# Patient Record
Sex: FEMALE | Race: WHITE | URBAN - METROPOLITAN AREA
[De-identification: names, ages, dates, MRNs, and addresses within clinical notes are randomized per-mention and may not be internally consistent; named-entity substitution may affect disease eponyms.]

---

## 2023-08-07 LAB — AMB EXT THINPREP PAP: NEGATIVE

## 2023-09-12 LAB
AMB EXT ANTIBODY SCREEN: NEGATIVE
AMB EXT CHLAMYDIA, NUCLEIC ACID AMP: NEGATIVE
AMB EXT GONOCOCCUS, NUCLEIC ACID AMP: NEGATIVE
AMB EXT HBSAG SCREEN: NEGATIVE
AMB EXT HEMATOCRIT: 41.1
AMB EXT HEMOGLOBIN: 13.4
AMB EXT HEPATITIS C VIRUS ANTIBODY: NEGATIVE
AMB EXT MCV: 91.7
AMB EXT PLATELETS: 338
AMB EXT RH FACTOR: POSITIVE
AMB EXT RUBELLA ANTIBODIES, IGG: 14.5
AMB EXT TSH: 1.6 MIU/ML

## 2023-12-06 ENCOUNTER — TELEPHONE (OUTPATIENT)
Dept: OBGYN CLINIC | Facility: CLINIC | Age: 38
End: 2023-12-06

## 2023-12-06 NOTE — TELEPHONE ENCOUNTER
RN returned pt call. Pt states she spoke with Krystina Barnard about establishing PN care with our office. Pt is aware that our office has both male and female providers that she will need to rotate throughout her PN care, and knows that the on-call provider will be the delivering doctor on the day of delivery. Pt agreeable to rotation policy. Pt also aware that her PN records will need to be reviewed by our providers and will need at least 3 providers to approve in order to be accepted after 20 weeks. Pt verbalized understanding. Pt made aware that no [facility] records have been received. Pt advised to reach back out to her current OB office and request to fax again. Pt agreed, and states she will likely drop off her PN records. Will await records.

## 2023-12-20 NOTE — TELEPHONE ENCOUNTER
Jj english'earle by 3 Drs. Please assist pt in scheduling OBN appointment. Pts records in [address] [facility] office, in Fax tray.

## 2023-12-20 NOTE — TELEPHONE ENCOUNTER
Received pts PN records from [facility]. PN transfer sheet completed. Pt informed that records will be put out for review today and can take up to a week or so to determine approval or denial. Pt advised to continue care with current practice until she hears from us. Pt verbalized understanding. Records placed on Ks desk.

## 2024-01-08 ENCOUNTER — NURSE ONLY (OUTPATIENT)
Dept: OBGYN CLINIC | Facility: CLINIC | Age: 39
End: 2024-01-08
Payer: COMMERCIAL

## 2024-01-08 DIAGNOSIS — Z34.82 ENCOUNTER FOR SUPERVISION OF OTHER NORMAL PREGNANCY IN SECOND TRIMESTER: Primary | ICD-10-CM

## 2024-01-08 RX ORDER — ASPIRIN 81 MG/1
81 TABLET ORAL DAILY
COMMUNITY

## 2024-01-08 RX ORDER — CHOLECALCIFEROL (VITAMIN D3) 25 MCG
1 TABLET,CHEWABLE ORAL DAILY
COMMUNITY

## 2024-01-08 NOTE — PROGRESS NOTES
Pt seen for OBN appt today with no complaints.  Pt is a transfer from [facility].  Lab results entered in results console and reviewed by DOREEN Singer.  Normal 28wk PN labs, treponemal, HIV and urine culture ordered.  Pt advised all labs must be completed and resulted prior to NPN appt. If labs are not completed and resulted the NPN appt will be cancelled. Assisted pt with scheduling NPN appt with MD with [facility] on 1/18/2024.     Pt is a diet controlled gestational diabetic.  Pt advised to keep log and bring it to her NPN visit.    Pt was complaining of rapid HR that lasts only a few seconds.  Pt states she had 2 episodes on this, one on 1/4/24 and one on 1/6/24.  Pt states she was not doing any activity that would cause this, and was not feeling nervous or anxious at the time.  Pt advised to monitor and call if it recurs.    Height: 5'2\"  Weight: 192  BMI: 35.1    Partner's name is Nikhil Currie contact #[phone]; race:   Occupation:   Pt's race :     MEDICAL HISTORY    Anemia No    Anesthetic complications No    Anxiety/Depression  No    Autoimmune Disorder No    Asthma  No    Cancer No    Diabetes  Yes Gestational diabetes   Gyne/breast Surgery Yes Right oophorectomy over 10 years ago  D&C x2   Heart Disease No    Hepatitis/Liver Disease  No    History of blood transfusion No    History of abnormal pap Yes Per pt, years ago, colpo done   Hypertension  No    Infertility  No    Kidney Disease/Frequent UTIs  No    Medication Allergies No    Latex Allergies No    Food Allergies  Yes    Neurological Disorder/Epilepsy Yes migraines   Operations/Hospitalizations Yes Right oophorectomy over 10 years ago  D&C x2   TB exposure  No    Thyroid Dysfunction No    Trauma/Violence  No    Uterine Anomaly  No    Uterine Fibroids  Yes    Variocosities/DVTs No    Smoker No    Drug usage in prior year No    Alcohol No    Would you accept a blood transfusion? If no, are you a Church? Yes    No             INFECTION HISTORY    Chlamydia No    Pt or partner have hx of Genital Herpes No    Gonorrhea No    Hepatitis B No    HIV No    HPV No    MRSA No    Syphilis No    Tattoos Yes    Live with someone or Exposed to TB No    Rash or viral illness since LMP  No    Varicella Yes As a child   Pets Yes 2 dogs       GENETICS SCREENING    Genetic Screening    Genetic Screening/Teratology Counseling- Includes patient, baby's father, or anyone in either family with:  Patient's age 35 years or older as of estimated date of delivery: Yes     Thalassemia (Italian, Greek, Mediterranean, or  background): MCV less than 80: No     Neural tube defect (Meningomyelocele, Spina bifida, or Anencephaly): No     Congenital heart defect: No     Down syndrome: No     Buster-Sachs (Ashkenazi Confucianism, Cajun, [place]): No     Canavan disease (Ashkenazi Confucianism): No     Familial dysautonomia (Ashkenazi Confucianism): No     Sickle cell disease or trait (): No     Hemophilia or other blood disorders: No     Muscular dystrophy: No    Cystic fibrosis: No     [place]'s chorea: No     Intellectual disability and/or autism: Yes (Comment: Pt's brother has autism)     If yes, was the person tested for Fragile X?: No (Comment: unknown)     Other inherited genetic or chromosomal disorder: Yes (Comment: HOSEA'rahul palmer was born with a heart condition but pt does not know the name)     Maternal metabolic disorder (eg. Type 1 diabetes, PKU): Yes (Comment: Pt's mother is diabetic but pt unsure if type 1 or 2.  Pt is a gestational diabetic)     Patient or baby's father had child with birth defects not listed above: No     Recurrent pregnancy loss, or a stillbirth: Yes (Comment: Pt's sister had 1-2 miscarriages)     Medications (including supplements, vitamins, herbs, or OTC drugs)/illicit/recreational drugs/alcohol since last menstrual period: Yes     If yes, agent(s) and strength/dosage: Pnv, bASA, Tylenol, Benadryl, amoxicillin, eye drops                  MISC    Infant vaccinations  Yes    Pt. Given What pregnant women need to know handout.

## 2024-01-18 ENCOUNTER — LAB ENCOUNTER (OUTPATIENT)
Dept: LAB | Facility: HOSPITAL | Age: 39
End: 2024-01-18
Attending: OBSTETRICS & GYNECOLOGY
Payer: COMMERCIAL

## 2024-01-18 ENCOUNTER — TELEPHONE (OUTPATIENT)
Dept: OBGYN CLINIC | Facility: CLINIC | Age: 39
End: 2024-01-18

## 2024-01-18 ENCOUNTER — INITIAL PRENATAL (OUTPATIENT)
Dept: OBGYN CLINIC | Facility: CLINIC | Age: 39
End: 2024-01-18
Payer: COMMERCIAL

## 2024-01-18 VITALS
SYSTOLIC BLOOD PRESSURE: 110 MMHG | WEIGHT: 190.38 LBS | BODY MASS INDEX: 35.03 KG/M2 | HEIGHT: 62 IN | DIASTOLIC BLOOD PRESSURE: 71 MMHG | HEART RATE: 86 BPM

## 2024-01-18 DIAGNOSIS — O24.319 PRE-EXISTING DIABETES MELLITUS DURING PREGNANCY, ANTEPARTUM: ICD-10-CM

## 2024-01-18 DIAGNOSIS — Z34.82 ENCOUNTER FOR SUPERVISION OF OTHER NORMAL PREGNANCY IN SECOND TRIMESTER: ICD-10-CM

## 2024-01-18 DIAGNOSIS — Z34.93 ENCOUNTER FOR SUPERVISION OF NORMAL PREGNANCY IN THIRD TRIMESTER, UNSPECIFIED GRAVIDITY: Primary | ICD-10-CM

## 2024-01-18 DIAGNOSIS — O09.521 AMA (ADVANCED MATERNAL AGE) MULTIGRAVIDA 35+, FIRST TRIMESTER: Primary | ICD-10-CM

## 2024-01-18 PROBLEM — O09.529 AMA (ADVANCED MATERNAL AGE) MULTIGRAVIDA 35+: Status: ACTIVE | Noted: 2024-01-18

## 2024-01-18 PROBLEM — O09.529 AMA (ADVANCED MATERNAL AGE) MULTIGRAVIDA 35+ (HCC): Status: ACTIVE | Noted: 2024-01-18

## 2024-01-18 LAB
APPEARANCE: CLEAR
BASOPHILS # BLD AUTO: 0.03 X10(3) UL (ref 0–0.2)
BASOPHILS NFR BLD AUTO: 0.3 %
BILIRUBIN: NEGATIVE
DEPRECATED RDW RBC AUTO: 45.3 FL (ref 35.1–46.3)
EOSINOPHIL # BLD AUTO: 0.14 X10(3) UL (ref 0–0.7)
EOSINOPHIL NFR BLD AUTO: 1.5 %
ERYTHROCYTE [DISTWIDTH] IN BLOOD BY AUTOMATED COUNT: 13.5 % (ref 11–15)
GLUCOSE (URINE DIPSTICK): NEGATIVE MG/DL
HCT VFR BLD AUTO: 33.4 %
HGB BLD-MCNC: 11.4 G/DL
IMM GRANULOCYTES # BLD AUTO: 0.03 X10(3) UL (ref 0–1)
IMM GRANULOCYTES NFR BLD: 0.3 %
KETONES (URINE DIPSTICK): 40 MG/DL
LEUKOCYTES: NEGATIVE
LYMPHOCYTES # BLD AUTO: 1.81 X10(3) UL (ref 1–4)
LYMPHOCYTES NFR BLD AUTO: 19.3 %
MCH RBC QN AUTO: 31.1 PG (ref 26–34)
MCHC RBC AUTO-ENTMCNC: 34.1 G/DL (ref 31–37)
MCV RBC AUTO: 91.3 FL
MONOCYTES # BLD AUTO: 0.5 X10(3) UL (ref 0.1–1)
MONOCYTES NFR BLD AUTO: 5.3 %
MULTISTIX LOT#: ABNORMAL NUMERIC
NEUTROPHILS # BLD AUTO: 6.89 X10 (3) UL (ref 1.5–7.7)
NEUTROPHILS # BLD AUTO: 6.89 X10(3) UL (ref 1.5–7.7)
NEUTROPHILS NFR BLD AUTO: 73.3 %
NITRITE, URINE: NEGATIVE
OCCULT BLOOD: NEGATIVE
PH, URINE: 7.5 (ref 4.5–8)
PLATELET # BLD AUTO: 308 10(3)UL (ref 150–450)
PROTEIN (URINE DIPSTICK): NEGATIVE MG/DL
RBC # BLD AUTO: 3.66 X10(6)UL
SPECIFIC GRAVITY: 1.01 (ref 1–1.03)
T PALLIDUM AB SER QL IA: NONREACTIVE
URINE-COLOR: YELLOW
UROBILINOGEN,SEMI-QN: 0.2 MG/DL (ref 0–1.9)
WBC # BLD AUTO: 9.4 X10(3) UL (ref 4–11)

## 2024-01-18 PROCEDURE — 3074F SYST BP LT 130 MM HG: CPT | Performed by: OBSTETRICS & GYNECOLOGY

## 2024-01-18 PROCEDURE — 87389 HIV-1 AG W/HIV-1&-2 AB AG IA: CPT

## 2024-01-18 PROCEDURE — 86780 TREPONEMA PALLIDUM: CPT

## 2024-01-18 PROCEDURE — 87086 URINE CULTURE/COLONY COUNT: CPT

## 2024-01-18 PROCEDURE — 85025 COMPLETE CBC W/AUTO DIFF WBC: CPT

## 2024-01-18 PROCEDURE — 3078F DIAST BP <80 MM HG: CPT | Performed by: OBSTETRICS & GYNECOLOGY

## 2024-01-18 PROCEDURE — 81002 URINALYSIS NONAUTO W/O SCOPE: CPT | Performed by: OBSTETRICS & GYNECOLOGY

## 2024-01-18 PROCEDURE — 3008F BODY MASS INDEX DOCD: CPT | Performed by: OBSTETRICS & GYNECOLOGY

## 2024-01-18 PROCEDURE — 36415 COLL VENOUS BLD VENIPUNCTURE: CPT

## 2024-01-18 NOTE — PROGRESS NOTES
Transfer of care.  On diet.  Will send BS log.   Last pap 8/2023.  Last gc/chlam 9/2023.  RTC 2 wk

## 2024-01-18 NOTE — TELEPHONE ENCOUNTER
Transfer of care from [facility].   Needs growth ultrasound and NST for pregestational DM and AMA

## 2024-02-01 ENCOUNTER — ROUTINE PRENATAL (OUTPATIENT)
Dept: OBGYN CLINIC | Facility: CLINIC | Age: 39
End: 2024-02-01
Payer: COMMERCIAL

## 2024-02-01 VITALS
WEIGHT: 188 LBS | DIASTOLIC BLOOD PRESSURE: 67 MMHG | SYSTOLIC BLOOD PRESSURE: 118 MMHG | BODY MASS INDEX: 34 KG/M2 | HEART RATE: 90 BPM

## 2024-02-01 DIAGNOSIS — O24.319 MODIFIED WHITE CLASS B PREGESTATIONAL DIABETES MELLITUS: ICD-10-CM

## 2024-02-01 DIAGNOSIS — Z34.03 ENCOUNTER FOR SUPERVISION OF NORMAL FIRST PREGNANCY IN THIRD TRIMESTER: Primary | ICD-10-CM

## 2024-02-01 LAB
APPEARANCE: CLEAR
GLUCOSE (URINE DIPSTICK): NEGATIVE MG/DL
MULTISTIX LOT#: NORMAL NUMERIC
NITRITE, URINE: NEGATIVE
URINE-COLOR: YELLOW

## 2024-02-01 PROCEDURE — 3074F SYST BP LT 130 MM HG: CPT | Performed by: OBSTETRICS & GYNECOLOGY

## 2024-02-01 PROCEDURE — 81002 URINALYSIS NONAUTO W/O SCOPE: CPT | Performed by: OBSTETRICS & GYNECOLOGY

## 2024-02-01 PROCEDURE — 3078F DIAST BP <80 MM HG: CPT | Performed by: OBSTETRICS & GYNECOLOGY

## 2024-02-14 ENCOUNTER — HOSPITAL ENCOUNTER (OUTPATIENT)
Dept: PERINATAL CARE | Facility: HOSPITAL | Age: 39
Discharge: HOME | End: 2024-02-14
Attending: OBSTETRICS & GYNECOLOGY
Payer: COMMERCIAL

## 2024-02-14 ENCOUNTER — TELEPHONE (OUTPATIENT)
Dept: OBGYN CLINIC | Facility: CLINIC | Age: 39
End: 2024-02-14

## 2024-02-14 ENCOUNTER — LAB ENCOUNTER (OUTPATIENT)
Dept: LAB | Facility: HOSPITAL | Age: 39
End: 2024-02-14
Attending: OBSTETRICS & GYNECOLOGY
Payer: COMMERCIAL

## 2024-02-14 ENCOUNTER — ROUTINE PRENATAL (OUTPATIENT)
Dept: OBGYN CLINIC | Facility: CLINIC | Age: 39
End: 2024-02-14
Payer: COMMERCIAL

## 2024-02-14 VITALS
HEART RATE: 99 BPM | BODY MASS INDEX: 35 KG/M2 | WEIGHT: 191 LBS | SYSTOLIC BLOOD PRESSURE: 115 MMHG | DIASTOLIC BLOOD PRESSURE: 61 MMHG

## 2024-02-14 VITALS
SYSTOLIC BLOOD PRESSURE: 101 MMHG | HEART RATE: 86 BPM | DIASTOLIC BLOOD PRESSURE: 65 MMHG | BODY MASS INDEX: 35 KG/M2 | WEIGHT: 191.38 LBS

## 2024-02-14 DIAGNOSIS — O09.529 AMA (ADVANCED MATERNAL AGE) MULTIGRAVIDA 35+: ICD-10-CM

## 2024-02-14 DIAGNOSIS — Z34.03 ENCOUNTER FOR SUPERVISION OF NORMAL FIRST PREGNANCY IN THIRD TRIMESTER: ICD-10-CM

## 2024-02-14 DIAGNOSIS — O09.521 AMA (ADVANCED MATERNAL AGE) MULTIGRAVIDA 35+, FIRST TRIMESTER: ICD-10-CM

## 2024-02-14 DIAGNOSIS — O24.313 PRE-EXISTING DIABETES MELLITUS DURING PREGNANCY IN THIRD TRIMESTER: ICD-10-CM

## 2024-02-14 DIAGNOSIS — O24.319 MODIFIED WHITE CLASS B PREGESTATIONAL DIABETES MELLITUS: ICD-10-CM

## 2024-02-14 DIAGNOSIS — Z34.93 ENCOUNTER FOR SUPERVISION OF NORMAL PREGNANCY IN THIRD TRIMESTER, UNSPECIFIED GRAVIDITY: Primary | ICD-10-CM

## 2024-02-14 DIAGNOSIS — O24.313 PRE-EXISTING DIABETES MELLITUS DURING PREGNANCY IN THIRD TRIMESTER: Primary | ICD-10-CM

## 2024-02-14 DIAGNOSIS — O24.319 PRE-EXISTING DIABETES MELLITUS DURING PREGNANCY, ANTEPARTUM: ICD-10-CM

## 2024-02-14 LAB
APPEARANCE: CLEAR
BILIRUBIN: NEGATIVE
DEPRECATED RDW RBC AUTO: 45 FL (ref 35.1–46.3)
ERYTHROCYTE [DISTWIDTH] IN BLOOD BY AUTOMATED COUNT: 13.4 % (ref 11–15)
EST. AVERAGE GLUCOSE BLD GHB EST-MCNC: 114 MG/DL (ref 68–126)
GLUCOSE (URINE DIPSTICK): NEGATIVE MG/DL
HBA1C MFR BLD: 5.6 % (ref ?–5.7)
HCT VFR BLD AUTO: 33.9 %
HGB BLD-MCNC: 11.8 G/DL
KETONES (URINE DIPSTICK): NEGATIVE MG/DL
LEUKOCYTES: NEGATIVE
MCH RBC QN AUTO: 31.7 PG (ref 26–34)
MCHC RBC AUTO-ENTMCNC: 34.8 G/DL (ref 31–37)
MCV RBC AUTO: 91.1 FL
MULTISTIX LOT#: ABNORMAL NUMERIC
NITRITE, URINE: NEGATIVE
OCCULT BLOOD: NEGATIVE
PH, URINE: 7.5 (ref 4.5–8)
PLATELET # BLD AUTO: 304 10(3)UL (ref 150–450)
PROTEIN (URINE DIPSTICK): NEGATIVE MG/DL
RBC # BLD AUTO: 3.72 X10(6)UL
SPECIFIC GRAVITY: 1 (ref 1–1.03)
T PALLIDUM AB SER QL IA: NONREACTIVE
URINE-COLOR: YELLOW
UROBILINOGEN,SEMI-QN: 0.2 MG/DL (ref 0–1.9)
WBC # BLD AUTO: 9.5 X10(3) UL (ref 4–11)

## 2024-02-14 PROCEDURE — 3078F DIAST BP <80 MM HG: CPT | Performed by: OBSTETRICS & GYNECOLOGY

## 2024-02-14 PROCEDURE — 76819 FETAL BIOPHYS PROFIL W/O NST: CPT

## 2024-02-14 PROCEDURE — 85027 COMPLETE CBC AUTOMATED: CPT

## 2024-02-14 PROCEDURE — 87389 HIV-1 AG W/HIV-1&-2 AB AG IA: CPT

## 2024-02-14 PROCEDURE — 76816 OB US FOLLOW-UP PER FETUS: CPT | Performed by: OBSTETRICS & GYNECOLOGY

## 2024-02-14 PROCEDURE — 36415 COLL VENOUS BLD VENIPUNCTURE: CPT

## 2024-02-14 PROCEDURE — 86780 TREPONEMA PALLIDUM: CPT

## 2024-02-14 PROCEDURE — 3074F SYST BP LT 130 MM HG: CPT | Performed by: OBSTETRICS & GYNECOLOGY

## 2024-02-14 PROCEDURE — 81002 URINALYSIS NONAUTO W/O SCOPE: CPT | Performed by: OBSTETRICS & GYNECOLOGY

## 2024-02-14 PROCEDURE — 83036 HEMOGLOBIN GLYCOSYLATED A1C: CPT

## 2024-02-14 NOTE — PROGRESS NOTES
Feeling well.  Good FM.  Diet controlled--reviewed BS log x 2 weeks.  Pt signed up for MC so will send weekly.  Has MFM today.  RTC 2 wks.

## 2024-02-14 NOTE — PROGRESS NOTES
Reason for Consult:   Dear Dr. Petit,    Thank you for requesting ultrasound evaluation and maternal fetal medicine consultation on Yesenia Currie.  As you are aware she is a 38 year old female with a Maxwell pregnancy at 32w0d.  A maternal-fetal medicine consultation was requested secondary to  GDM A1.  Her prenatal records and labs were reviewed.  Late transfer of care.  Review of History:     OB History:    OB History    Para Term  AB Living   3 0 0 0 2 0   SAB IAB Ectopic Multiple Live Births   0 0 0 0 0      # Outcome Date GA Lbr Hayes/2nd Weight Sex Delivery Anes PTL Lv   3 Current            2 AB 2007     THERAPEUTIC      1 AB                      Allergies:  Allergies   Allergen Reactions    Shellfish-Derived Products HIVES, ITCHING and SWELLING      Current Meds:  Current Outpatient Medications   Medication Sig Dispense Refill    Probiotic Product (PROBIOTIC-10) Oral Chew Tab Chew by mouth.      prenatal vitamin with DHA 27-0.8-228 MG Oral Cap Take 1 capsule by mouth daily.      aspirin 81 MG Oral Tab EC Take 1 tablet (81 mg total) by mouth daily.          HISTORY:  Past Medical History:   Diagnosis Date    Decorative tattoo     Fibroids     History of varicella as a child     Migraine       Past Surgical History:   Procedure Laterality Date    COLPOSCOPY, CERVIX W/UPPER ADJACENT VAGINA; W/BIOPSY(S), CERVIX      D & C      OOPHORECTOMY Right     ovarian cyst      Family History   Problem Relation Age of Onset    Colon Cancer Father     Hypertension Mother     Diabetes Mother     Other (Other) Maternal Grandmother       Social History     Socioeconomic History    Marital status:    Tobacco Use    Smoking status: Never    Smokeless tobacco: Never   Substance and Sexual Activity    Alcohol use: Not Currently     Comment: socially    Drug use: Never     Social Determinants of Health     Financial Resource Strain: Low Risk  (2024)    Financial Resource Strain     Difficulty of  Paying Living Expenses: Not very hard     Med Affordability: No   Food Insecurity: No Food Insecurity (1/17/2024)    Food Insecurity     Food Insecurity: Never true   Transportation Needs: No Transportation Needs (1/17/2024)    Transportation Needs     Lack of Transportation: No   Stress: No Stress Concern Present (1/17/2024)    Stress     Feeling of Stress : No   Housing Stability: Low Risk  (1/17/2024)    Housing Stability     Housing Instability: No        NARRATIVE:     /61   Pulse 99   Wt 191 lb (86.6 kg)   LMP 07/05/2023 (Exact Date)   BMI 34.93 kg/m²           Alert and Oriented.  No acute distress          Abdomen:  soft, nontender, no contractions noted.           extremities:  nontender, no edema          DISCUSSION  During her visit we discussed and reviewed the following issues:           She was informed of the potential implications and risks associated with gestational diabetes (GDM) to her and her unborn child, especially when poorly controlled. We discussed about the increased incidence of macrosomia and related birth injury to her and her baby. We talked about the increased and associated risk of fetal hyperinsulinemia, jaundice, electrolyte imbalance, seizure activity, IUFD and adverse outcome. We talked about her increased risk of having diabetes later in life. The importance of good glycemic control and avoidance of prolonged hypo- and hyperglycemia was addressed.    DISCUSSION  During her visit we discussed and reviewed the following issues:  ADVANCED MATERNAL AGE    I reviewed with the patient that pregnancies in women of advanced maternal age (35 or older at delivery) are associated with elevated risks. Specifically, there is a higher rate of:  Fetal malformations  Preeclampsia  Gestational diabetes  Intrauterine fetal death              OB ULTRASOUND REPORT   See imaging tab for complete ultrasound report or in PACS    Single IUP in cephalic presentation.    Placenta is anterior,  high.   A 3 vessel cord is noted.  Cardiac activity is present at 137 bpm  EFW 1983 g ( 4 lb 6 oz); 54%.    RAFI is  9.7 cm.  MVP is 4.5 cm      Uterus  Fibroids   1. Size 51 mm x 35 mm x 43 mm. Mean 42.8 mm. Vol 39.533 cm³. Right lateral wall  2. Size 51 mm x 50 mm x 38 mm. Mean 46.4 mm. Vol 51.060 cm³. Right lateral posterior wall        BIOPHYSICAL PROFILE:  Movement:    2/2  Tone:            2/2  Breathin/2  Fluid:             2/2  TOTAL:               IMPRESSION:   1. IUP @  32w0d  2. Scan consistent with dates  3. No fetal structural abnormalities seen  4. GDM A2  5.  AMA    RECOMMENDATIONS:   1.  Weekly NST at 36wks      Thank you for allowing me to participate in the care of your patient.  Please do not hesitate to call with any questions or concerns.     Total time spent   40  minutes this calendar day which includes preparing to see the patient including chart review, obtaining and/or reviewing additional medical history, performing a physical exam and evaluation, documenting clinical information in the electronic medical record, independently interpreting results, counseling the patient, communicating results to the patient/family/caregiver and coordinating care.    Jairo Richards D.O.  Maternal Fetal Medicine     Note to patient and family:  The 21st Century Cures Act makes medical notes available to patients in the interest of transparency.  However, please be advised that this is a medical document.  It is intended as a peer to peer communication.  It is written in medical language and may contain abbreviations or verbiage that are technical and unfamiliar.  It may appear blunt or direct.  Medical documents are intended to carry relevant information, facts as evident, and the clinical opinion of the practitioner.

## 2024-02-14 NOTE — TELEPHONE ENCOUNTER
Pt called and informed she does not need to fast for her lab work. Pt also asking about NST's, pt has OB appt with [facility] today and [facility]. Pt informed she would schedule with [facility] and she can do so when she is there for her appt.

## 2024-02-24 ENCOUNTER — PATIENT MESSAGE (OUTPATIENT)
Dept: OBGYN CLINIC | Facility: CLINIC | Age: 39
End: 2024-02-24

## 2024-02-26 NOTE — TELEPHONE ENCOUNTER
From: Yesenia Currie  To: Luca Jensen  Sent: 2/24/2024 8:59 PM CST  Subject: Blood glucose record    Blood glucose record for week ending 2/17 and 2/24

## 2024-02-28 ENCOUNTER — ROUTINE PRENATAL (OUTPATIENT)
Dept: OBGYN CLINIC | Facility: CLINIC | Age: 39
End: 2024-02-28

## 2024-02-28 VITALS
HEART RATE: 87 BPM | BODY MASS INDEX: 35 KG/M2 | WEIGHT: 193.63 LBS | SYSTOLIC BLOOD PRESSURE: 117 MMHG | DIASTOLIC BLOOD PRESSURE: 69 MMHG

## 2024-02-28 DIAGNOSIS — Z34.91 ENCOUNTER FOR SUPERVISION OF NORMAL PREGNANCY IN FIRST TRIMESTER, UNSPECIFIED GRAVIDITY (HCC): Primary | ICD-10-CM

## 2024-02-28 LAB
APPEARANCE: CLEAR
BILIRUBIN: NEGATIVE
GLUCOSE (URINE DIPSTICK): NEGATIVE MG/DL
KETONES (URINE DIPSTICK): NEGATIVE MG/DL
LEUKOCYTES: NEGATIVE
MULTISTIX LOT#: NORMAL NUMERIC
NITRITE, URINE: NEGATIVE
OCCULT BLOOD: NEGATIVE
PH, URINE: 7 (ref 4.5–8)
PROTEIN (URINE DIPSTICK): NEGATIVE MG/DL
SPECIFIC GRAVITY: 1.01 (ref 1–1.03)
URINE-COLOR: YELLOW
UROBILINOGEN,SEMI-QN: 0.2 MG/DL (ref 0–1.9)

## 2024-02-28 PROCEDURE — 3074F SYST BP LT 130 MM HG: CPT | Performed by: OBSTETRICS & GYNECOLOGY

## 2024-02-28 PROCEDURE — 81002 URINALYSIS NONAUTO W/O SCOPE: CPT | Performed by: OBSTETRICS & GYNECOLOGY

## 2024-02-28 PROCEDURE — 3078F DIAST BP <80 MM HG: CPT | Performed by: OBSTETRICS & GYNECOLOGY

## 2024-03-08 ENCOUNTER — HOSPITAL ENCOUNTER (INPATIENT)
Facility: HOSPITAL | Age: 39
LOS: 2 days | Discharge: HOME | End: 2024-03-10
Attending: OBSTETRICS & GYNECOLOGY | Admitting: OBSTETRICS & GYNECOLOGY
Payer: COMMERCIAL

## 2024-03-08 ENCOUNTER — ANESTHESIA (OUTPATIENT)
Dept: OBGYN UNIT | Facility: HOSPITAL | Age: 39
End: 2024-03-08
Payer: COMMERCIAL

## 2024-03-08 ENCOUNTER — ANESTHESIA EVENT (OUTPATIENT)
Dept: OBGYN UNIT | Facility: HOSPITAL | Age: 39
End: 2024-03-08
Payer: COMMERCIAL

## 2024-03-08 PROBLEM — Z34.90 PREGNANCY (HCC): Status: ACTIVE | Noted: 2024-03-08

## 2024-03-08 LAB
ANTIBODY SCREEN: NEGATIVE
BASOPHILS # BLD AUTO: 0.03 X10(3) UL (ref 0–0.2)
BASOPHILS NFR BLD AUTO: 0.3 %
DEPRECATED RDW RBC AUTO: 45.5 FL (ref 35.1–46.3)
EOSINOPHIL # BLD AUTO: 0.06 X10(3) UL (ref 0–0.7)
EOSINOPHIL NFR BLD AUTO: 0.6 %
ERYTHROCYTE [DISTWIDTH] IN BLOOD BY AUTOMATED COUNT: 13.7 % (ref 11–15)
HCT VFR BLD AUTO: 33.9 %
HGB BLD-MCNC: 11.2 G/DL
IMM GRANULOCYTES # BLD AUTO: 0.05 X10(3) UL (ref 0–1)
IMM GRANULOCYTES NFR BLD: 0.5 %
LYMPHOCYTES # BLD AUTO: 1.76 X10(3) UL (ref 1–4)
LYMPHOCYTES NFR BLD AUTO: 16.4 %
MCH RBC QN AUTO: 30 PG (ref 26–34)
MCHC RBC AUTO-ENTMCNC: 33 G/DL (ref 31–37)
MCV RBC AUTO: 90.9 FL
MONOCYTES # BLD AUTO: 0.49 X10(3) UL (ref 0.1–1)
MONOCYTES NFR BLD AUTO: 4.6 %
NEUTROPHILS # BLD AUTO: 8.33 X10 (3) UL (ref 1.5–7.7)
NEUTROPHILS # BLD AUTO: 8.33 X10(3) UL (ref 1.5–7.7)
NEUTROPHILS NFR BLD AUTO: 77.6 %
PLATELET # BLD AUTO: 290 10(3)UL (ref 150–450)
RBC # BLD AUTO: 3.73 X10(6)UL
RH BLOOD TYPE: POSITIVE
RH BLOOD TYPE: POSITIVE
WBC # BLD AUTO: 10.7 X10(3) UL (ref 4–11)

## 2024-03-08 PROCEDURE — 86850 RBC ANTIBODY SCREEN: CPT | Performed by: OBSTETRICS & GYNECOLOGY

## 2024-03-08 PROCEDURE — 88307 TISSUE EXAM BY PATHOLOGIST: CPT | Performed by: OBSTETRICS & GYNECOLOGY

## 2024-03-08 PROCEDURE — 86901 BLOOD TYPING SEROLOGIC RH(D): CPT | Performed by: OBSTETRICS & GYNECOLOGY

## 2024-03-08 PROCEDURE — 87081 CULTURE SCREEN ONLY: CPT | Performed by: OBSTETRICS & GYNECOLOGY

## 2024-03-08 PROCEDURE — 0KQM0ZZ REPAIR PERINEUM MUSCLE, OPEN APPROACH: ICD-10-PCS | Performed by: OBSTETRICS & GYNECOLOGY

## 2024-03-08 PROCEDURE — 99214 OFFICE O/P EST MOD 30 MIN: CPT

## 2024-03-08 PROCEDURE — 86900 BLOOD TYPING SEROLOGIC ABO: CPT | Performed by: OBSTETRICS & GYNECOLOGY

## 2024-03-08 PROCEDURE — 85025 COMPLETE CBC W/AUTO DIFF WBC: CPT | Performed by: OBSTETRICS & GYNECOLOGY

## 2024-03-08 PROCEDURE — 87150 DNA/RNA AMPLIFIED PROBE: CPT | Performed by: OBSTETRICS & GYNECOLOGY

## 2024-03-08 RX ORDER — IBUPROFEN 600 MG/1
600 TABLET ORAL ONCE AS NEEDED
Status: DISCONTINUED | OUTPATIENT
Start: 2024-03-08 | End: 2024-03-08 | Stop reason: HOSPADM

## 2024-03-08 RX ORDER — ACETAMINOPHEN 500 MG
1000 TABLET ORAL EVERY 6 HOURS PRN
Status: DISCONTINUED | OUTPATIENT
Start: 2024-03-08 | End: 2024-03-10

## 2024-03-08 RX ORDER — DEXTROSE, SODIUM CHLORIDE, SODIUM LACTATE, POTASSIUM CHLORIDE, AND CALCIUM CHLORIDE 5; .6; .31; .03; .02 G/100ML; G/100ML; G/100ML; G/100ML; G/100ML
INJECTION, SOLUTION INTRAVENOUS AS NEEDED
Status: DISCONTINUED | OUTPATIENT
Start: 2024-03-08 | End: 2024-03-08 | Stop reason: HOSPADM

## 2024-03-08 RX ORDER — LIDOCAINE HCL/EPINEPHRINE/PF 2%-1:200K
VIAL (ML) INJECTION AS NEEDED
Status: DISCONTINUED | OUTPATIENT
Start: 2024-03-08 | End: 2024-03-08 | Stop reason: SURG

## 2024-03-08 RX ORDER — ACETAMINOPHEN 500 MG
1000 TABLET ORAL EVERY 6 HOURS PRN
Status: DISCONTINUED | OUTPATIENT
Start: 2024-03-08 | End: 2024-03-08 | Stop reason: HOSPADM

## 2024-03-08 RX ORDER — ACETAMINOPHEN 500 MG
500 TABLET ORAL EVERY 6 HOURS PRN
Status: DISCONTINUED | OUTPATIENT
Start: 2024-03-08 | End: 2024-03-08 | Stop reason: HOSPADM

## 2024-03-08 RX ORDER — SIMETHICONE 80 MG
80 TABLET,CHEWABLE ORAL 3 TIMES DAILY PRN
Status: DISCONTINUED | OUTPATIENT
Start: 2024-03-08 | End: 2024-03-10

## 2024-03-08 RX ORDER — AMMONIA INHALANTS 0.04 G/.3ML
0.3 INHALANT RESPIRATORY (INHALATION) AS NEEDED
Status: DISCONTINUED | OUTPATIENT
Start: 2024-03-08 | End: 2024-03-10

## 2024-03-08 RX ORDER — SODIUM CHLORIDE, SODIUM LACTATE, POTASSIUM CHLORIDE, CALCIUM CHLORIDE 600; 310; 30; 20 MG/100ML; MG/100ML; MG/100ML; MG/100ML
INJECTION, SOLUTION INTRAVENOUS CONTINUOUS
Status: DISCONTINUED | OUTPATIENT
Start: 2024-03-08 | End: 2024-03-08 | Stop reason: HOSPADM

## 2024-03-08 RX ORDER — LIDOCAINE HYDROCHLORIDE 10 MG/ML
30 INJECTION, SOLUTION EPIDURAL; INFILTRATION; INTRACAUDAL; PERINEURAL ONCE
Status: DISCONTINUED | OUTPATIENT
Start: 2024-03-08 | End: 2024-03-08 | Stop reason: HOSPADM

## 2024-03-08 RX ORDER — LIDOCAINE HCL/EPINEPHRINE/PF 2%-1:200K
VIAL (ML) INJECTION
Status: DISPENSED
Start: 2024-03-08 | End: 2024-03-08

## 2024-03-08 RX ORDER — BISACODYL 10 MG
10 SUPPOSITORY, RECTAL RECTAL ONCE AS NEEDED
Status: DISCONTINUED | OUTPATIENT
Start: 2024-03-08 | End: 2024-03-10

## 2024-03-08 RX ORDER — DOCUSATE SODIUM 100 MG/1
100 CAPSULE, LIQUID FILLED ORAL
Status: DISCONTINUED | OUTPATIENT
Start: 2024-03-08 | End: 2024-03-10

## 2024-03-08 RX ORDER — TERBUTALINE SULFATE 1 MG/ML
0.25 INJECTION, SOLUTION SUBCUTANEOUS AS NEEDED
Status: DISCONTINUED | OUTPATIENT
Start: 2024-03-08 | End: 2024-03-08 | Stop reason: HOSPADM

## 2024-03-08 RX ORDER — BUPIVACAINE HCL/0.9 % NACL/PF 0.25 %
5 PLASTIC BAG, INJECTION (ML) EPIDURAL AS NEEDED
Status: DISCONTINUED | OUTPATIENT
Start: 2024-03-08 | End: 2024-03-10

## 2024-03-08 RX ORDER — IBUPROFEN 600 MG/1
600 TABLET ORAL EVERY 6 HOURS
Status: DISCONTINUED | OUTPATIENT
Start: 2024-03-08 | End: 2024-03-10

## 2024-03-08 RX ORDER — CITRIC ACID/SODIUM CITRATE 334-500MG
30 SOLUTION, ORAL ORAL AS NEEDED
Status: DISCONTINUED | OUTPATIENT
Start: 2024-03-08 | End: 2024-03-08 | Stop reason: HOSPADM

## 2024-03-08 RX ORDER — ACETAMINOPHEN 500 MG
500 TABLET ORAL EVERY 6 HOURS PRN
Status: DISCONTINUED | OUTPATIENT
Start: 2024-03-08 | End: 2024-03-10

## 2024-03-08 RX ORDER — BUPIVACAINE HYDROCHLORIDE 2.5 MG/ML
20 INJECTION, SOLUTION EPIDURAL; INFILTRATION; INTRACAUDAL ONCE
Status: DISCONTINUED | OUTPATIENT
Start: 2024-03-08 | End: 2024-03-08 | Stop reason: HOSPADM

## 2024-03-08 RX ORDER — LIDOCAINE HYDROCHLORIDE 10 MG/ML
INJECTION, SOLUTION EPIDURAL; INFILTRATION; INTRACAUDAL; PERINEURAL
Status: DISPENSED
Start: 2024-03-08 | End: 2024-03-09

## 2024-03-08 RX ORDER — ONDANSETRON 2 MG/ML
4 INJECTION INTRAMUSCULAR; INTRAVENOUS EVERY 6 HOURS PRN
Status: DISCONTINUED | OUTPATIENT
Start: 2024-03-08 | End: 2024-03-10

## 2024-03-08 RX ORDER — NALBUPHINE HYDROCHLORIDE 10 MG/ML
2.5 INJECTION, SOLUTION INTRAMUSCULAR; INTRAVENOUS; SUBCUTANEOUS
Status: DISCONTINUED | OUTPATIENT
Start: 2024-03-08 | End: 2024-03-10

## 2024-03-08 RX ORDER — BENZOCAINE/MENTHOL 6 MG-10 MG
1 LOZENGE MUCOUS MEMBRANE EVERY 6 HOURS PRN
Status: DISCONTINUED | OUTPATIENT
Start: 2024-03-08 | End: 2024-03-10

## 2024-03-08 RX ORDER — CHOLECALCIFEROL (VITAMIN D3) 25 MCG
1 TABLET,CHEWABLE ORAL DAILY
Status: DISCONTINUED | OUTPATIENT
Start: 2024-03-08 | End: 2024-03-09

## 2024-03-08 RX ORDER — BUPIVACAINE HYDROCHLORIDE 2.5 MG/ML
INJECTION, SOLUTION EPIDURAL; INFILTRATION; INTRACAUDAL
Status: COMPLETED | OUTPATIENT
Start: 2024-03-08 | End: 2024-03-08

## 2024-03-08 RX ORDER — BETAMETHASONE SODIUM PHOSPHATE AND BETAMETHASONE ACETATE 3; 3 MG/ML; MG/ML
12 INJECTION, SUSPENSION INTRA-ARTICULAR; INTRALESIONAL; INTRAMUSCULAR; SOFT TISSUE EVERY 24 HOURS
Status: DISPENSED | OUTPATIENT
Start: 2024-03-08 | End: 2024-03-10

## 2024-03-08 RX ORDER — ONDANSETRON 2 MG/ML
4 INJECTION INTRAMUSCULAR; INTRAVENOUS EVERY 6 HOURS PRN
Status: DISCONTINUED | OUTPATIENT
Start: 2024-03-08 | End: 2024-03-08 | Stop reason: HOSPADM

## 2024-03-08 RX ADMIN — BUPIVACAINE HYDROCHLORIDE 5 ML: 2.5 INJECTION, SOLUTION EPIDURAL; INFILTRATION; INTRACAUDAL at 07:43:00

## 2024-03-08 RX ADMIN — LIDOCAINE HCL/EPINEPHRINE/PF 3 ML: 2%-1:200K VIAL (ML) INJECTION at 07:43:00

## 2024-03-08 NOTE — H&P
[facility]  part of [facility]    History & Physical    Yesenia Currie Patient Status:  Inpatient    1985 MRN [id number]   Location [facility] Attending Emile Batista, DO   Hosp Day # 0 PCP No primary care provider on file.     Date of Admission:  3/8/2024      HPI:   Yesenia Currie is a 38 year old  female, current EGA of 35w2d with an estimated date of delivery of: 4/10/2024, by Last Menstrual Period      Yesenia Currie is being admitted for PPROM ans labor management.  Her current obstetrical history is significant for AMA and Class B DM on diet. Normal A1c in February. She had normal level 2 at [facility] prior to transfer of care. She had [facility] MFM f/u growth on 24 with EFW at 54%. She is Rh positive, rubella immune, serology and GBS unknown. No HTN or anemia.   Patient called me through service overnight reporting leaking of fluid  since 0245 . I directed her to [facility]. SROM was confirmed by RN exam and patient was claribel regularly. Admitted on my order for GBS prophylaxis and labor management.  Epidural placed this AM around 0745.  Fetal Movement: good    History   Obstetric History:   OB History    Para Term  AB Living   3       2     SAB IAB Ectopic Multiple Live Births                  # Outcome Date GA Lbr Hayes/2nd Weight Sex Delivery Anes PTL Lv   3 Current            2 AB 2007     THERAPEUTIC      1 AB              Past Medical History:   Past Medical History:   Diagnosis Date    Decorative tattoo     Fibroids     History of varicella as a child     Migraine      Past Surgerical History:   Past Surgical History:   Procedure Laterality Date    COLPOSCOPY, CERVIX W/UPPER ADJACENT VAGINA; W/BIOPSY(S), CERVIX      D & C      OOPHORECTOMY Right     ovarian cyst     Social History:   Social History     Tobacco Use    Smoking status: Never    Smokeless tobacco: Never   Substance Use Topics    Alcohol use: Not Currently      Comment: socially        Allergies/Medications:   Allergies:   Allergies   Allergen Reactions    Shellfish Allergy ITCHING, RASH and SWELLING     Rash and swelling    Shellfish-Derived Products HIVES, ITCHING and SWELLING     Medications:  Medications Prior to Admission   Medication Sig Dispense Refill Last Dose    METAMUCIL FIBER OR Take by mouth.   3/7/2024    Probiotic Product (PROBIOTIC-10) Oral Chew Tab Chew by mouth.   3/7/2024    prenatal vitamin with DHA 27-0.8-228 MG Oral Cap Take 1 capsule by mouth daily.   3/7/2024    aspirin 81 MG Oral Tab EC Take 1 tablet (81 mg total) by mouth daily.   3/7/2024       Review of Systems:   As documented in HPI      Physical Exam:   Temp:  [97.6 °F (36.4 °C)-97.9 °F (36.6 °C)] 97.9 °F (36.6 °C)  Pulse:  [] 109  Resp:  [18-20] 18  BP: ()/(43-72) 110/61  SpO2:  [94 %-99 %] 99 %    Constitutional: alert and cooperative in No distress  Abdomen: gravid nontender  Vaginal exam:  Dilation: 4 cm    Effacement: 90 %    Station: -2    Position: Cephalic    By RN exam    FHT assessment:   Baseline: 140 bpm   Variability: moderate   Accels:  present   Decels: variable   Tocos:  contractions every 2-5 minute with some couplets   Category: 2 tracing    Results:     O positive with negative Ab screen  H/H = 11.2g / 33% and platelets = 290K       Assessment/Plan:      premature rupture of membranes (PPROM) with onset of labor within 24 hours of rupture in third trimester, antepartum (HCC)        Pregnancy (HCC)        Treatment Plan:  Expectant management and anticipate      Emile Batista DO  3/8/2024  8:32 AM

## 2024-03-08 NOTE — PLAN OF CARE
Problem: Patient Centered Care  Goal: Patient preferences are identified and integrated in the patient's plan of care  Description: Interventions:  - What would you like us to know as we care for you? It's a girl!  - Provide timely, complete, and accurate information to patient/family  - Incorporate patient and family knowledge, values, beliefs, and cultural backgrounds into the planning and delivery of care  - Encourage patient/family to participate in care and decision-making at the level they choose  - Honor patient and family perspectives and choices  3/8/2024 1755 by Kyara Miguel RN  Outcome: Progressing     Problem: Patient/Family Goals  Goal: Patient/Family Long Term Goal  Description: Patient's Long Term Goal: Uncomplicated Delivery     Interventions:  - Assessment/Monitoring  - Induction/Augmentation per protocol and Provider order  - C/S per protocol and Provider order   - Education  - Intervention per protocol and Provider order with education   - Involve patient in POC  - See additional Care Plan goals for specific interventions  3/8/2024 1755 by Kyara Miguel, RN  Outcome: Progressing     Problem: Patient/Family Goals  Goal: Patient/Family Short Term Goal  Description: Patient's Short Term Goal: Comfort and Pain Control     Interventions:   - Non Pharmacological pain intervention   - IV/IM and Epidural pain medication per Provider order and patient request  - Education  - Involve Patient in POC   - See additional Care Plan goals for specific interventions  3/8/2024 1755 by Kyara Miguel RN  Outcome: Progressing     Problem: GENITOURINARY - ADULT  Goal: Absence of urinary retention  Description: INTERVENTIONS:  - Assess patient’s ability to void and empty bladder  - Monitor intake/output and perform bladder scan as needed  - Follow urinary retention protocol/standard of care  - Consider collaborating with pharmacy to review patient's medication profile  - Implement strategies to promote bladder  emptying  3/8/2024 1755 by Kyara Miguel, RN  Outcome: Progressing     Problem: POSTPARTUM  Goal: Long Term Goal:Experiences normal postpartum course  Description: INTERVENTIONS:  - Assess and monitor vital signs and lab values.  - Assess fundus and lochia.  - Provide ice/sitz baths for perineum discomfort.  - Monitor healing of incision/episiotomy/laceration, and assess for signs and symptoms of infection and hematoma.  - Assess bladder function and monitor for bladder distention.  - Provide/instruct/assist with pericare as needed.  - Provide VTE prophylaxis as needed.  - Monitor bowel function.  - Encourage ambulation and provide assistance as needed.  - Assess and monitor emotional status and provide social service/psych resources as needed.  - Utilize standard precautions and use personal protective equipment as indicated. Ensure aseptic care of all intravenous lines and invasive tubes/drains.  - Obtain immunization and exposure to communicable diseases history.  Outcome: Progressing  Goal: Optimize infant feeding at the breast  Description: INTERVENTIONS:  - Initiate breast feeding within first hour after birth.   - Monitor effectiveness of current breast feeding efforts.  - Assess support systems available to mother/family.  - Identify cultural beliefs/practices regarding lactation, letdown techniques, maternal food preferences.  - Assess mother's knowledge and previous experience with breast feeding.  - Provide information as needed about early infant feeding cues (e.g., rooting, lip smacking, sucking fingers/hand) versus late cue of crying.  - Discuss/demonstrate breast feeding aids (e.g., infant sling, nursing footstool/pillows, and breast pumps).  - Encourage mother/other family members to express feelings/concerns, and actively listen.  - Educate father/SO about benefits of breast feeding and how to manage common lactation challenges.  - Recommend avoidance of specific medications or substances  incompatible with breast feeding.  - Assess and monitor for signs of nipple pain/trauma.  - Instruct and provide assistance with proper latch.  - Review techniques for milk expression (breast pumping) and storage of breast milk. Provide pumping equipment/supplies, instructions and assistance, as needed.  - Encourage rooming-in and breast feeding on demand.  - Encourage skin-to-skin contact.  - Provide LC support as needed.  - Assess for and manage engorgement.  - Provide breast feeding education handouts and information on community breast feeding support.   Outcome: Progressing  Goal: Establishment of adequate milk supply with medication/procedure interruptions  Description: INTERVENTIONS:  - Review techniques for milk expression (breast pumping).   - Provide pumping equipment/supplies, instructions, and assistance until it is safe to breastfeed infant.  Outcome: Progressing  Goal: Experiences normal breast weaning course  Description: INTERVENTIONS:  - Assess for and manage engorgement.  - Instruct on breast care.  - Provide comfort measures.  Outcome: Progressing  Goal: Appropriate maternal -  bonding  Description: INTERVENTIONS:  - Assess caregiver- interactions.  - Assess caregiver's emotional status and coping mechanisms.  - Encourage caregiver to participate in  daily care.  - Assess support systems available to mother/family.  - Provide /case management support as needed.  Outcome: Progressing

## 2024-03-08 NOTE — DISCHARGE SUMMARY
[facility]  part of [facility]    Discharge Summary    Yesenia Currie Patient Status:  Inpatient    1985 MRN [id number]   Location [facility] Attending Emile Batista DO   Hosp Day # 2       Admission date:  3/8/2024    Delivering OB Clinician: Dilcia    EDC: Estimated Date of Delivery: 4/10/24    Gestational Age: 35w2d    Antepartum complications:   Patient Active Problem List   Diagnosis    AMA--38    BMI 33.0-33.9,adult    Pre-existing diabetes mellitus during pregnancy (HCC)    Pregnancy (HCC)     premature rupture of membranes (PPROM) with onset of labor within 24 hours of rupture in third trimester, antepartum ([facility])       Date of Delivery: 3/8/2024  Time of Delivery: 2:44 PM     Delivery Type: spontaneous vaginal delivery    Baby: Liveborn female   Information for the patient's :  Kush Girl [[id number]]   5 lb 15.9 oz (2.72 kg)   Apgars:  1 minute: 9   5 minutes: 9 10 minutes:       Intrapartum Complications: None    Discharge Date: 3/10/24    Hospital Course: Pt admitted PPROM at 35 weeks on 3/8 at 2:45a  and +UC's.  She progressed to an precipitous  attended by RN of a viable female infant over 2nd degree laceration and repair. No complications. Routine delivery and postpartum care.    Discharge Physical Exam:   /70 (BP Location: Right arm)   Pulse 78   Temp 98 °F (36.7 °C) (Oral)   Resp 15   Ht 5' 2\" (1.575 m)   Wt 193 lb (87.5 kg)   LMP 2023 (Exact Date)   SpO2 98%   Breastfeeding Yes   BMI 35.30 kg/m²   General appearance:  alert, appears stated age, cooperative and no distress  Abdominal: soft, non-tender, no rebound  Uterus: firm, nontender, below umbilicus  Pelvic: deferred  Extremities: Homans sign is negative, no sign of DVT    Discharged Condition: stable    Disposition: home    Plan:     Follow-up appointment in 6 weeks with Dr. Ha        3/8/2024  4:44 PM

## 2024-03-08 NOTE — L&D DELIVERY NOTE
[facility]  part of [facility]    Vaginal Delivery Note    Yesenia Currie Patient Status:  Inpatient    1985 MRN [id number]   Location [facility] Attending Emile Batista DO   Hosp Day # 0 PCP No primary care provider on file.     Delivery     Infant Info:  Date of Delivery: 3/8/2024    Time of Delivery: 2:44 PM   Delivery Type: Normal spontaneous vaginal delivery     Live   Information for the patient's :  Kush, Girl [[id number]]   female  infant   Information for the patient's :  Kush Girl [[id number]]   5 lb 15.9 oz (2.72 kg)    Apgars:  1 minute: 9                5 minutes: 9                         10 minutes:      Presentation Vertex [1]     Position Middle [2]  Occiput [1]  Anterior [1]     Delivery Comment: Baby OA.  Pt precipitously delivered in bed while RN was examining her. Shoulder atraumatically delivered followed by the trunk and LE. Primary MD arrived to deliver the placenta. Cord clamped and cut. Baby handed to the awaiting nursing personal. Placenta delivered complete and intact with a 3 VC. 2nd degree repair of the perineal laceration with 2-0 and 3-0 chromic with good hemostasis. Sponge count correct. Placenta sent to pathology.  and patient stable in the delivery room with nursing present.    Maternal Anesthesia: epidural     Delivery Complications:  none    Neonatologist Present: no    Placenta info:  Date/Time of Delivery: 3/8/2024  2:50 PM    Delivery: spontaneous  Placenta to Pathology: yes due to PPROM    Cord info:  Cord Gases Submitted: no  Cord Blood Collection: no  Cord Tissue Collection: no  Cord Complications: none    Sponge and Needle Counts:  Verified    Quantitative Blood Loss (mL) 200      Leonela Ha MD   3/8/2024  4:49 PM

## 2024-03-08 NOTE — PLAN OF CARE
Problem: Patient Centered Care  Goal: Patient preferences are identified and integrated in the patient's plan of care  Description: Interventions:  - What would you like us to know as we care for you?  - Provide timely, complete, and accurate information to patient/family  - Incorporate patient and family knowledge, values, beliefs, and cultural backgrounds into the planning and delivery of care  - Encourage patient/family to participate in care and decision-making at the level they choose  - Honor patient and family perspectives and choices  Outcome: Progressing     Problem: Patient/Family Goals  Goal: Patient/Family Long Term Goal  Description: Patient's Long Term Goal:    Interventions:  - See additional Care Plan goals for specific interventions  Outcome: Progressing  Goal: Patient/Family Short Term Goal  Description: Patient's Short Term Goal:    Interventions:   - See additional Care Plan goals for specific interventions  Outcome: Progressing     Problem: BIRTH - VAGINAL/ SECTION  Goal: Fetal and maternal status remain reassuring during the birth process  Description: INTERVENTIONS:  - Monitor vital signs  - Monitor fetal heart rate  - Monitor uterine activity  - Monitor labor progression (vaginal delivery)  - DVT prophylaxis (C/S delivery)  - Surgical antibiotic prophylaxis (C/S delivery)  Outcome: Progressing     Problem: PAIN - ADULT  Goal: Verbalizes/displays adequate comfort level or patient's stated pain goal  Description: INTERVENTIONS:  - Encourage pt to monitor pain and request assistance  - Assess pain using appropriate pain scale  - Administer analgesics based on type and severity of pain and evaluate response  - Implement non-pharmacological measures as appropriate and evaluate response  - Consider cultural and social influences on pain and pain management  - Manage/alleviate anxiety  - Utilize distraction and/or relaxation techniques  - Monitor for opioid side effects  - Notify MD/LIP if  interventions unsuccessful or patient reports new pain  - Anticipate increased pain with activity and pre-medicate as appropriate  Outcome: Progressing     Problem: ANXIETY  Goal: Will report anxiety at manageable levels  Description: INTERVENTIONS:  - Administer medication as ordered  - Teach and rehearse alternative coping skills  - Provide emotional support with 1:1 interaction with staff  Outcome: Progressing

## 2024-03-08 NOTE — PLAN OF CARE
Pt is a 38 year old female admitted to LDR2/LDR2-A.     Chief Complaint   Patient presents with    R/o Rom     Pt reports feeling leaking of fluid since 2330 on 3/7.       Pt is  35w2d intra-uterine pregnancy.  History obtained, consents signed. Oriented to room, staff, and plan of care.

## 2024-03-08 NOTE — ANESTHESIA PROCEDURE NOTES
Labor Analgesia    Date/Time: 3/8/2024 7:43 AM    Performed by: Rodo Chavis MD  Authorized by: Rodo Chavis MD      General Information and Staff    Start Time:  3/8/2024 7:43 AM  End Time:  3/8/2024 7:43 AM  Anesthesiologist:  Rodo Chavis MD  Performed by:  Anesthesiologist  Patient Location:  OB  Reason for Block: labor epidural    Preanesthetic Checklist: patient identified, IV checked, risks and benefits discussed, monitors and equipment checked, pre-op evaluation, timeout performed, anesthesia consent and sterile technique used      Procedure Details    Patient Position:  Sitting  Prep: ChloraPrep    Monitoring:  Heart rate  Approach:  Midline    Epidural Needle    Injection Technique:  NAVID air  Needle Type:  Tuohy  Needle Gauge:  18 G  Needle Length:  3.5 in  Location:  L2-3    Spinal Needle      Catheter    Catheter Type:  Multi-orifice  Catheter Size:  20 G  Test Dose:  Negative    Assessment      Additional Comments

## 2024-03-08 NOTE — ANESTHESIA PREPROCEDURE EVALUATION
Anesthesia PreOp Note    HPI:     Yesenia Currie is a 38 year old female who presents for preoperative consultation requested by: * No surgeons listed *    Date of Surgery: 3/8/2024    * No procedures listed *  Indication: * No pre-op diagnosis entered *    Relevant Problems   No relevant active problems       NPO:                         History Review:  Patient Active Problem List    Diagnosis Date Noted    Pregnancy ([facility]) 03/08/2024    AMA--38 01/18/2024    BMI 33.0-33.9,adult 01/18/2024    Pre-existing diabetes mellitus during pregnancy ([facility]) 01/18/2024       Past Medical History:   Diagnosis Date    Decorative tattoo     Fibroids     History of varicella as a child     Migraine        Past Surgical History:   Procedure Laterality Date    COLPOSCOPY, CERVIX W/UPPER ADJACENT VAGINA; W/BIOPSY(S), CERVIX      D & C      OOPHORECTOMY Right     ovarian cyst       Medications Prior to Admission   Medication Sig Dispense Refill Last Dose    METAMUCIL FIBER OR Take by mouth.   3/7/2024    Probiotic Product (PROBIOTIC-10) Oral Chew Tab Chew by mouth.   3/7/2024    prenatal vitamin with DHA 27-0.8-228 MG Oral Cap Take 1 capsule by mouth daily.   3/7/2024    aspirin 81 MG Oral Tab EC Take 1 tablet (81 mg total) by mouth daily.   3/7/2024     Current Facility-Administered Medications Ordered in Epic   Medication Dose Route Frequency Provider Last Rate Last Admin    acetaminophen (Tylenol Extra Strength) tab 500 mg  500 mg Oral Q6H PRN Emile Batista DO        acetaminophen (Tylenol Extra Strength) tab 1,000 mg  1,000 mg Oral Q6H PRN Emile Batista DO        ibuprofen (Motrin) tab 600 mg  600 mg Oral Once PRN Emile Batista DO        ondansetron (Zofran) 4 MG/2ML injection 4 mg  4 mg Intravenous Q6H PRN Emile Batista DO        oxyTOCIN in sodium chloride 0.9% (Pitocin) 30 Units/500mL infusion premix  62.5-900 che-units/min Intravenous Continuous Emile Batista DO        terbutaline (Brethine) 1 MG/ML  injection 0.25 mg  0.25 mg Subcutaneous PRN MacTorsten, Emile A, DO        sodium citrate-citric acid (Bicitra) 500-334 MG/5ML oral solution 30 mL  30 mL Oral PRN Emile Batista A, DO        lidocaine PF (Xylocaine-MPF) 1% injection  30 mL Intradermal Once MacTorsten, Emile A, DO        lactated ringers infusion   Intravenous Continuous MacDuffEmile A,  mL/hr at 03/08/24 0316 New Bag at 03/08/24 0316    dextrose in lactated ringers 5% infusion   Intravenous PRN MacDuff, Emile A, DO        lactated ringers IV bolus 500 mL  500 mL Intravenous PRN Emile Batista A, DO        fentaNYL (Sublimaze) 50 mcg/mL injection 50 mcg  50 mcg Intravenous Q30 Min PRN Emile Batista, DO        ampicillin (Omnipen) 1 g in sodium chloride 0.9% 100 mL IVPB-MBP  1 g Intravenous Q4H Emile Batista, DO        oxyTOCIN in sodium chloride 0.9% (Pitocin) 30 Units/500mL infusion premix  0.5-20 che-units/min Intravenous Continuous Emile Batista, DO 2 mL/hr at 03/08/24 0637 2 che-units/min at 03/08/24 0637    betamethasone sodium phosphate & acetate (Celestone) 6 (3-3) MG/ML injection 12 mg  12 mg Intramuscular Q24H Emile Batista, DO   12 mg at 03/08/24 0311    fentaNYL-bupivacaine 2 mcg/mL-0.125% in sodium chloride 0.9% 100 mL EPIDURAL infusion premix   Epidural Continuous Fannie Garcia MD        fentaNYL (Sublimaze) 50 mcg/mL injection 100 mcg  100 mcg Epidural Once Fannie Garcia MD        bupivacaine PF (Marcaine) 0.25% injection  20 mL Epidural Once Fannie Garcia MD        EPHEDrine (PF) 25 MG/5 ML injection 5 mg  5 mg Intravenous PRN Fannie Garcia MD        nalbuphine (Nubain) 10 mg/mL injection 2.5 mg  2.5 mg Intravenous Q15 Min PRN Fannie Garcia MD        lidocaine-EPINEPHrine (Xylocaine-Epinephrine) 2 %-1:779005 injection              No current Epic-ordered outpatient medications on file.       Allergies   Allergen Reactions    Shellfish Allergy ITCHING, RASH and SWELLING     Rash and swelling    Shellfish-Derived Products  HIVES, ITCHING and SWELLING       Family History   Problem Relation Age of Onset    Colon Cancer Father     Hypertension Mother     Diabetes Mother     Other (Other) Maternal Grandmother      Social History     Socioeconomic History    Marital status:    Tobacco Use    Smoking status: Never    Smokeless tobacco: Never   Substance and Sexual Activity    Alcohol use: Not Currently     Comment: socially    Drug use: Never       Available pre-op labs reviewed.  Lab Results   Component Value Date    WBC 10.7 03/08/2024    RBC 3.73 (L) 03/08/2024    HGB 11.2 (L) 03/08/2024    HCT 33.9 (L) 03/08/2024    MCV 90.9 03/08/2024    MCH 30.0 03/08/2024    MCHC 33.0 03/08/2024    RDW 13.7 03/08/2024    .0 03/08/2024             Vital Signs:  There is no height or weight on file to calculate BMI.   oral temperature is 97.9 °F (36.6 °C). Her blood pressure is 125/60 and her pulse is 75. Her respiration is 18 and oxygen saturation is 98%.   Vitals:    03/08/24 0350 03/08/24 0550 03/08/24 0731 03/08/24 0738   BP: 133/72  123/64 125/60   Pulse: 74  85 75   Resp: 18      Temp: 97.8 °F (36.6 °C) 97.9 °F (36.6 °C)     TempSrc: Oral Oral     SpO2:   98%         Anesthesia Evaluation     Patient summary reviewed and Nursing notes reviewed    Airway   Mallampati: I  Dental      Pulmonary - negative ROS and normal exam   Cardiovascular - normal exam  Exercise tolerance: good    NYHA Classification: I    Neuro/Psych - negative ROS     GI/Hepatic/Renal - negative ROS     Endo/Other - negative ROS   Abdominal                  Anesthesia Plan:   ASA:  2  Plan:   Epidural  Post-op Pain Management: IV analgesics      I have informed Yesenia Currie and/or legal guardian or family member of the nature of the anesthetic plan, benefits, risks including possible dental damage if relevant, major complications, and any alternative forms of anesthetic management.   All of the patient's questions were answered to the best of my ability. The  patient desires the anesthetic management as planned.  OMID NANCE MD  3/8/2024 7:42 AM  Present on Admission:  **None**

## 2024-03-08 NOTE — PLAN OF CARE
Problem: Patient Centered Care  Goal: Patient preferences are identified and integrated in the patient's plan of care  Description: Interventions:  - What would you like us to know as we care for you? It's a girl!  - Provide timely, complete, and accurate information to patient/family  - Incorporate patient and family knowledge, values, beliefs, and cultural backgrounds into the planning and delivery of care  - Encourage patient/family to participate in care and decision-making at the level they choose  - Honor patient and family perspectives and choices  Outcome: Progressing     Problem: Patient/Family Goals  Goal: Patient/Family Long Term Goal  Description: Patient's Long Term Goal: Uncomplicated Delivery     Interventions:  - Assessment/Monitoring  - Induction/Augmentation per protocol and Provider order  - C/S per protocol and Provider order   - Education  - Intervention per protocol and Provider order with education   - Involve patient in POC  - See additional Care Plan goals for specific interventions  Outcome: Progressing  Goal: Patient/Family Short Term Goal  Description: Patient's Short Term Goal: Comfort and Pain Control     Interventions:   - Non Pharmacological pain intervention   - IV/IM and Epidural pain medication per Provider order and patient request  - Education  - Involve Patient in POC   - See additional Care Plan goals for specific interventions  Outcome: Progressing     Problem: BIRTH - VAGINAL/ SECTION  Goal: Fetal and maternal status remain reassuring during the birth process  Description: INTERVENTIONS:  - Monitor vital signs  - Monitor fetal heart rate  - Monitor uterine activity  - Monitor labor progression (vaginal delivery)  - DVT prophylaxis (C/S delivery)  - Surgical antibiotic prophylaxis (C/S delivery)  Outcome: Progressing     Problem: PAIN - ADULT  Goal: Verbalizes/displays adequate comfort level or patient's stated pain goal  Description: INTERVENTIONS:  - Encourage pt  to monitor pain and request assistance  - Assess pain using appropriate pain scale  - Administer analgesics based on type and severity of pain and evaluate response  - Implement non-pharmacological measures as appropriate and evaluate response  - Consider cultural and social influences on pain and pain management  - Manage/alleviate anxiety  - Utilize distraction and/or relaxation techniques  - Monitor for opioid side effects  - Notify MD/LIP if interventions unsuccessful or patient reports new pain  - Anticipate increased pain with activity and pre-medicate as appropriate  Outcome: Progressing     Problem: ANXIETY  Goal: Will report anxiety at manageable levels  Description: INTERVENTIONS:  - Administer medication as ordered  - Teach and rehearse alternative coping skills  - Provide emotional support with 1:1 interaction with staff  Outcome: Progressing

## 2024-03-08 NOTE — PROGRESS NOTES
Pt is a 38 year old female admitted to TR1/TR1-A.     Chief Complaint   Patient presents with    R/o Rom     Pt reports feeling leaking of fluid since 2330 on 3/7.       Pt is  35w2d intra-uterine pregnancy.  History obtained, consents signed. Oriented to room, staff, and plan of care.

## 2024-03-08 NOTE — ANESTHESIA POSTPROCEDURE EVALUATION
Patient: Yesenia Currie    Procedure Summary       Date: 03/08/24 Room / Location:     Anesthesia Start: 0743 Anesthesia Stop: 1450    Procedure: LABOR ANALGESIA Diagnosis:     Scheduled Providers:  Anesthesiologist: Omid Nance MD    Anesthesia Type: epidural ASA Status: 2            Anesthesia Type: epidural    Vitals Value Taken Time   /60 03/08/24 1546   Temp 98 03/08/24 1607   Pulse 77 03/08/24 1546   Resp 16 03/08/24 1515   SpO2 98 % 03/08/24 1500       [facility] AN Post Evaluation:   Patient Evaluated in PACU  Patient Participation: complete - patient participated  Level of Consciousness: awake  Pain Management: adequate  Airway Patency:patent  Dental exam unchanged from preop  Yes    Cardiovascular Status: acceptable  Respiratory Status: acceptable  Postoperative Hydration acceptable      OMID NANCE MD  3/8/2024 4:07 PM

## 2024-03-08 NOTE — PAYOR COMM NOTE
--------------  ADMISSION REVIEW     Payor: [employer] P[employer]  Subscriber #:  [id number]  Authorization Number: [id number]    Admit date: 3/8/24  Admit time:  2:47 AM       REVIEW DOCUMENTATION:      Yesenia Currie is a 38 year old  female, current EGA of 35w2d with an estimated date of delivery of: 4/10/2024, by Last Menstrual Period        Yesenia Currie is being admitted for PPROM ans labor management.  Her current obstetrical history is significant for AMA and Class B DM on diet. Normal A1c in February. She had normal level 2 at [facility] prior to transfer of care. She had DULY MFM f/u growth on 24 with EFW at 54%. She is Rh positive, rubella immune, serology and GBS unknown. No HTN or anemia.   Patient called me through service overnight reporting leaking of fluid  since 0245 . I directed her to FB. SROM was confirmed by RN exam and patient was claribel regularly. Admitted on my order for GBS prophylaxis and labor management.  Epidural placed this AM around 0745.  Fetal Movement: good     History   Obstetric History:                    OB History    Para Term  AB Living   3       2     SAB IAB Ectopic Multiple Live Births                         # Outcome Date GA Lbr Hayes/2nd Weight Sex Delivery Anes PTL Lv   3 Current                     2 AB          THERAPEUTIC         1 AB                      Temp:  [97.6 °F (36.4 °C)-97.9 °F (36.6 °C)] 97.9 °F (36.6 °C)  Pulse:  [] 109  Resp:  [18-20] 18  BP: ()/(43-72) 110/61  SpO2:  [94 %-99 %] 99 %     Constitutional: alert and cooperative in No distress  Abdomen: gravid nontender  Vaginal exam:    Dilation: 4 cm                              Effacement: 90 %                              Station: -2                              Position: Cephalic                              By RN exam     FHT assessment:                Baseline: 140 bpm                Variability: moderate                Accels:  present                 Decels: variable                Tocos:  contractions every 2-5 minute with some couplets                Category: 2 tracing     Results:      O positive with negative Ab screen  H/H = 11.2g / 33% and platelets = 290K        Assessment/Plan:      premature rupture of membranes (PPROM) with onset of labor within 24 hours of rupture in third trimester, antepartum (HCC)          Pregnancy (HCC)           Treatment Plan:  Expectant management and anticipate               MEDICATIONS ADMINISTERED IN LAST 1 DAY:  ampicillin (Omnipen) 1 g in sodium chloride 0.9% 100 mL IVPB-MBP       Date Action Dose Route User    3/8/2024 1221 New Bag 1 g Intravenous Lazara Padilla RN    3/8/2024 0820 New Bag 1 g Intravenous Lazara Padilla RN          ampicillin (Omnipen) 2 g in sodium chloride 0.9% 100 mL IVPB-MBP       Date Action Dose Route User    3/8/2024 0316 New Bag 2 g Intravenous Mariana Bosch RN          betamethasone sodium phosphate & acetate (Celestone) 6 (3-3) MG/ML injection 12 mg       Date Action Dose Route User    3/8/2024 0311 Given 12 mg Intramuscular (Left Leg) Mariana Bosch RN          bupivacaine PF (Marcaine) 0.25% injection       Date Action Dose Route User    3/8/2024 0743 Given 5 mL Epidural Rodo Chavis MD          EPHEDrine (PF) 25 MG/5 ML injection 5 mg       Date Action Dose Route User    3/8/2024 0808 Given 5 mg Intravenous Lazara Padilla RN    3/8/2024 0753 Given 10 mg Intravenous Lazara Padilla RN          fentaNYL-bupivacaine 2 mcg/mL-0.125% in sodium chloride 0.9% 100 mL EPIDURAL infusion premix       Date Action Dose Route User    3/8/2024 0747 New Bag 10 mL/hr Epidural Lazara Padilla RN          fentaNYL (Sublimaze) 50 mcg/mL injection 100 mcg       Date Action Dose Route User    3/8/2024 0445 Given 100 mcg Intravenous Bozena Barroso RN          fentaNYL (Sublimaze) 50 mcg/mL injection       Date Action Dose Route User    3/8/2024 0743 Given 100 mcg Epidural Partha  Rodo JOHN MD          lactated ringers IV bolus 1,000 mL       Date Action Dose Route User    3/8/2024 0650 Bolus from Bag 1,000 mL Intravenous Bozena Barroso, DOREEN          lactated ringers infusion       Date Action Dose Route User    3/8/2024 0316 New Bag (none) Intravenous Mariana Bosch RN          lidocaine 2%-EPINEPHrine 1:200,000 (Xylocaine-Epinephrine) injection       Date Action Dose Route User    3/8/2024 0743 Given 3 mL Epidural Rodo Chavis MD          oxyTOCIN in sodium chloride 0.9% (Pitocin) 30 Units/500mL infusion premix       Date Action Dose Route User    3/8/2024 1000 Rate/Dose Change 10 che-units/min Intravenous Lazara Padilla RN    3/8/2024 0945 Rate/Dose Change 8 che-units/min Intravenous Lazara Padilla RN    3/8/2024 0930 Rate/Dose Change 6 che-units/min Intravenous Lazara Padilla RN    3/8/2024 0912 Rate/Dose Change 4 che-units/min Intravenous Lazara Padilla RN    3/8/2024 0637 New Bag 2 ceh-units/min Intravenous Bozena Barroso, RN            Vitals (last day)       Date/Time Temp Pulse Resp BP SpO2 Weight O2 Device O2 Flow Rate (L/min) [facility]    03/08/24 1415 -- 95 -- 126/68 99 % -- -- -- MB    03/08/24 1400 -- 75 -- 93/60 98 % -- -- -- MB    03/08/24 1345 -- 87 -- 116/45 98 % -- -- -- MB    03/08/24 1330 -- 77 -- 118/62 98 % -- -- -- MB    03/08/24 1315 -- 73 -- 113/66 99 % -- -- -- MB    03/08/24 0731 98 °F (36.7 °C) 85 -- 123/64 98 % -- -- -- MB    03/08/24 0715 -- -- -- -- -- 193 lb -- -- MB    03/08/24 0550 97.9 °F (36.6 °C) -- -- -- -- -- -- -- BK    03/08/24 0350 97.8 °F (36.6 °C) 74 18 133/72 -- -- -- -- JUS    03/08/24 0215 97.6 °F (36.4 °C) 80 20 125/70 -- -- -- -- GM

## 2024-03-08 NOTE — PROGRESS NOTES
Patient up to bathroom with assist x 2.  Unable to void at this time. Patient transferred to mother/baby room 370 per wheelchair in stable condition with personal belongings.  Accompanied by significant other and staff.  Report given to mother/baby RN Kyara.

## 2024-03-09 LAB
BASOPHILS # BLD AUTO: 0.03 X10(3) UL (ref 0–0.2)
BASOPHILS NFR BLD AUTO: 0.2 %
DEPRECATED RDW RBC AUTO: 47.8 FL (ref 35.1–46.3)
EOSINOPHIL # BLD AUTO: 0.01 X10(3) UL (ref 0–0.7)
EOSINOPHIL NFR BLD AUTO: 0.1 %
ERYTHROCYTE [DISTWIDTH] IN BLOOD BY AUTOMATED COUNT: 14.1 % (ref 11–15)
GROUP B STREP BY PCR FOR PCR OVT: NEGATIVE
HCT VFR BLD AUTO: 29.1 %
HGB BLD-MCNC: 9.5 G/DL
IMM GRANULOCYTES # BLD AUTO: 0.11 X10(3) UL (ref 0–1)
IMM GRANULOCYTES NFR BLD: 0.8 %
LYMPHOCYTES # BLD AUTO: 1.78 X10(3) UL (ref 1–4)
LYMPHOCYTES NFR BLD AUTO: 12.7 %
MCH RBC QN AUTO: 30 PG (ref 26–34)
MCHC RBC AUTO-ENTMCNC: 32.6 G/DL (ref 31–37)
MCV RBC AUTO: 91.8 FL
MONOCYTES # BLD AUTO: 0.73 X10(3) UL (ref 0.1–1)
MONOCYTES NFR BLD AUTO: 5.2 %
NEUTROPHILS # BLD AUTO: 11.38 X10 (3) UL (ref 1.5–7.7)
NEUTROPHILS # BLD AUTO: 11.38 X10(3) UL (ref 1.5–7.7)
NEUTROPHILS NFR BLD AUTO: 81 %
PLATELET # BLD AUTO: 266 10(3)UL (ref 150–450)
RBC # BLD AUTO: 3.17 X10(6)UL
WBC # BLD AUTO: 14 X10(3) UL (ref 4–11)

## 2024-03-09 PROCEDURE — 85025 COMPLETE CBC W/AUTO DIFF WBC: CPT | Performed by: OBSTETRICS & GYNECOLOGY

## 2024-03-09 RX ORDER — CHOLECALCIFEROL (VITAMIN D3) 25 MCG
1 TABLET,CHEWABLE ORAL NIGHTLY
Status: DISCONTINUED | OUTPATIENT
Start: 2024-03-10 | End: 2024-03-10

## 2024-03-09 NOTE — LACTATION NOTE
LACTATION NOTE - MOTHER      Evaluation Type: Inpatient    Problems identified  Problems identified: Knowledge deficit;Unable to acheive sustained latch;Milk supply not WNL  Milk supply not WNL: Reduced (potential)  Problems Identified Other: infant in nicu, 35 weeks    Maternal history  Maternal history: AMA  Other/comment: PPROm    Breastfeeding goal  Breastfeeding goal: To maintain breast milk feeding per patient goal    Maternal Assessment  Bilateral Breasts: Soft;Symmetrical  Bilateral Nipples: Slightly everted/short;WNL  Prior breastfeeding experience (comment below): Primip  Breastfeeding Assistance: Hand expression provided with permission;Pumping assistance provided with permission    Pain assessment  Location/Comment: denies  Treatment of Sore Nipples: Lanolin;Expressed breast milk    Guidelines for use of:  Breast pump type: Colby Mathew  Current use of pump:: initiated  Other (comment): Set up pump and educated on use and assisted with pumping session. Educated on supply and demand and need to pump q 3, all questions answered. Provided swabs and bottles and reveiwed nicu labeling and transport guidelines. Encouraged to call for support prn.

## 2024-03-09 NOTE — PROGRESS NOTES
[facility]  part of [facility]    OB/Gyne Post  Progress Note      Yesenia Currie Patient Status:  Inpatient    1985 MRN [id number]   Location [facility] 3SE Attending Emile Batista, DO   Hosp Day # 1 PCP No primary care provider on file.       Subjective     Good pain control. Tolerating present diet. Ambulating well. Voiding freely.  She denies headache, fever, chest pain, shortness of breath, dizziness upon ambulation nor leg pain.     Objective   Vital signs in last 24 hours:  Vitals:    24 2000 24 0000 24 0400 24 0755   BP: 109/63 101/65 104/65 107/69   Pulse: 74 64 76 79   Resp: 18 18 18 16   Temp: 98.2 °F (36.8 °C) 98 °F (36.7 °C) 98.3 °F (36.8 °C) 98.1 °F (36.7 °C)   TempSrc: Oral Oral Oral Oral   SpO2:       Weight:       Height:            Input/Output:    Intake/Output Summary (Last 24 hours) at 3/9/2024 1319  Last data filed at 3/8/2024 2100  Gross per 24 hour   Intake 640 ml   Output 1550 ml   Net -910 ml       AVSS  Constitutional: comfortable  Abdomen: soft nontender  Uterus: fundus below umbilicus, non tender  Extremities: No calf tenderness, SCDs on while in bed, No pitting edema.      Results:   Labs / Path / Radiology:    Recent Results (from the past 24 hour(s))   CBC W/ DIFFERENTIAL    Collection Time: 24  5:50 AM   Result Value Ref Range    WBC 14.0 (H) 4.0 - 11.0 x10(3) uL    RBC 3.17 (L) 3.80 - 5.30 x10(6)uL    HGB 9.5 (L) 12.0 - 16.0 g/dL    HCT 29.1 (L) 35.0 - 48.0 %    MCV 91.8 80.0 - 100.0 fL    MCH 30.0 26.0 - 34.0 pg    MCHC 32.6 31.0 - 37.0 g/dL    RDW-SD 47.8 (H) 35.1 - 46.3 fL    RDW 14.1 11.0 - 15.0 %    .0 150.0 - 450.0 10(3)uL    Neutrophil Absolute Prelim 11.38 (H) 1.50 - 7.70 x10 (3) uL    Neutrophil Absolute 11.38 (H) 1.50 - 7.70 x10(3) uL    Lymphocyte Absolute 1.78 1.00 - 4.00 x10(3) uL    Monocyte Absolute 0.73 0.10 - 1.00 x10(3) uL    Eosinophil Absolute 0.01 0.00 - 0.70 x10(3) uL    Basophil  Absolute 0.03 0.00 - 0.20 x10(3) uL    Immature Granulocyte Absolute 0.11 0.00 - 1.00 x10(3) uL    Neutrophil % 81.0 %    Lymphocyte % 12.7 %    Monocyte % 5.2 %    Eosinophil % 0.1 %    Basophil % 0.2 %    Immature Granulocyte % 0.8 %       Specimens (From admission, onward)      None            No results found.      Assessment/Plan   38 year oldyo  , s/p spontaneous vaginal, PPD# 1     Patient Active Problem List   Diagnosis    AMA--38    BMI 33.0-33.9,adult    Pre-existing diabetes mellitus during pregnancy (HCC)    Pregnancy (HCC)     premature rupture of membranes (PPROM) with onset of labor within 24 hours of rupture in third trimester, antepartum ([facility])   .    ambulate, continue routine postpartum care          Rekha Guerrero MD  3/9/2024  1:19 PM

## 2024-03-09 NOTE — PLAN OF CARE
Problem: Patient Centered Care  Goal: Patient preferences are identified and integrated in the patient's plan of care  Description: Interventions:  - What would you like us to know as we care for you? It's a girl!  - Provide timely, complete, and accurate information to patient/family  - Incorporate patient and family knowledge, values, beliefs, and cultural backgrounds into the planning and delivery of care  - Encourage patient/family to participate in care and decision-making at the level they choose  - Honor patient and family perspectives and choices  Outcome: Progressing     Problem: GENITOURINARY - ADULT  Goal: Absence of urinary retention  Description: INTERVENTIONS:  - Assess patient’s ability to void and empty bladder  - Monitor intake/output and perform bladder scan as needed  - Follow urinary retention protocol/standard of care  - Consider collaborating with pharmacy to review patient's medication profile  - Implement strategies to promote bladder emptying  Outcome: Progressing     Problem: POSTPARTUM  Goal: Optimize infant feeding at the breast  Description: INTERVENTIONS:  - Initiate breast feeding within first hour after birth.   - Monitor effectiveness of current breast feeding efforts.  - Assess support systems available to mother/family.  - Identify cultural beliefs/practices regarding lactation, letdown techniques, maternal food preferences.  - Assess mother's knowledge and previous experience with breast feeding.  - Provide information as needed about early infant feeding cues (e.g., rooting, lip smacking, sucking fingers/hand) versus late cue of crying.  - Discuss/demonstrate breast feeding aids (e.g., infant sling, nursing footstool/pillows, and breast pumps).  - Encourage mother/other family members to express feelings/concerns, and actively listen.  - Educate father/SO about benefits of breast feeding and how to manage common lactation challenges.  - Recommend avoidance of specific  medications or substances incompatible with breast feeding.  - Assess and monitor for signs of nipple pain/trauma.  - Instruct and provide assistance with proper latch.  - Review techniques for milk expression (breast pumping) and storage of breast milk. Provide pumping equipment/supplies, instructions and assistance, as needed.  - Encourage rooming-in and breast feeding on demand.  - Encourage skin-to-skin contact.  - Provide LC support as needed.  - Assess for and manage engorgement.  - Provide breast feeding education handouts and information on community breast feeding support.   Outcome: Progressing  Goal: Establishment of adequate milk supply with medication/procedure interruptions  Description: INTERVENTIONS:  - Review techniques for milk expression (breast pumping).   - Provide pumping equipment/supplies, instructions, and assistance until it is safe to breastfeed infant.  Outcome: Progressing  Goal: Appropriate maternal -  bonding  Description: INTERVENTIONS:  - Assess caregiver- interactions.  - Assess caregiver's emotional status and coping mechanisms.  - Encourage caregiver to participate in  daily care.  - Assess support systems available to mother/family.  - Provide /case management support as needed.  Outcome: Progressing

## 2024-03-10 VITALS
HEART RATE: 78 BPM | OXYGEN SATURATION: 98 % | SYSTOLIC BLOOD PRESSURE: 113 MMHG | HEIGHT: 62 IN | RESPIRATION RATE: 15 BRPM | DIASTOLIC BLOOD PRESSURE: 70 MMHG | BODY MASS INDEX: 35.51 KG/M2 | WEIGHT: 193 LBS | TEMPERATURE: 98 F

## 2024-03-10 NOTE — LACTATION NOTE
LACTATION NOTE - MOTHER      Evaluation Type: Inpatient    Problems identified  Problems identified: Knowledge deficit;Unable to acheive sustained latch;Milk supply not WNL  Milk supply not WNL: Reduced (potential)  Problems Identified Other: baby is 35WKS, she was in scn and now is out with mom.    Maternal history  Maternal history: AMA    Breastfeeding goal  Breastfeeding goal: To maintain breast milk feeding per patient goal    Maternal Assessment  Bilateral Breasts: Soft;Symmetrical  Bilateral Nipples: Colostrum easily expressed;Slightly everted/short  Prior breastfeeding experience (comment below): Primip  Breastfeeding Assistance: Breastfeeding assistance provided with permission;Hand expression provided with permission;Pumping assistance provided with permission;Breast exam provided with permission    Pain assessment  Location/Comment: denies  Treatment of Sore Nipples: Lanolin    Guidelines for use of:  Breast pump type: Ameda Platinum;Hand Pump  Suggested use of pump: Pump 8-12X/24hr              Patient called LC in assist with latching. When LC came in mom was attempting to latch, baby was only latching, and holding nipple in mouth, and no active sucks or swallows or nutritive sucking seen. Baby has been very sleepy, and not actively latching since out with mom patient states. Patient assisted with using a nipple shield, and baby still showing no feeding cues and no sucks or swallows seen. Discussed with mom importance of feedings, and with baby being 35 wks, she might not be able to be effective at the breast right now, good to try, but limit time at the breast if baby giving no feeding cues, and no active sucks or swallows heard, and she needs to pump and supplement with each fed. Encourage to schedule an outpt appt, and pt states will call after she is discharged to schedule. Baby will not be discharged today. Pt verbalizes understanding of plan.

## 2024-03-10 NOTE — LACTATION NOTE
This note was copied from a baby's chart.  LACTATION NOTE - INFANT    Evaluation Type  Evaluation Type: Inpatient    Problems & Assessment  Problems Diagnosed or Identified: Hx of NICU/SCN admission;Premature;Latch difficulty;Disorganized suck  Infant Assessment: Minimal hunger cues present  Muscle tone: Appropriate for GA    Feeding Assessment  Summary Current Feeding: Breastfeeding with breast milk supplement;Breastfeeding with formula supplement;Breastfeeding using a nipple shield  Breastfeeding Assessment: Assisted with breastfeeding w/mother's permission;No sustained latch to breast;Needs pacing;Sleepy infant, quickly pacifies  Breastfeeding Positions: football;right breast;left breast  Latch: Too sleepy or reluctant, no latch achieved  Audible Sucks/Swallows: None  Type of Nipple: Everted (after stimulation)  Comfort (Breast/Nipple): Soft/non-tender  Hold (Positioning): Full assist, teach one side, mother does other, staff holds  LATCH Score: 5

## 2024-03-10 NOTE — DISCHARGE INSTRUCTIONS
-Pelvic rest for 6 weeks; no sex, tampons, douching, baths, or pools until after 6 weeks check-up.  -No heavy lifting; increase activity gradually.    CALL YOUR PROVIDER IF:  Increased/heavy bleeding (I.e. Changing a saturated pad every hour).  New onset chills/fever greater than 100.4.  Pain in your vagina or abdomen that gets worse and isn't relieved with medicine.  Swelling or discharge with a bad odor from vagina.  Burning, pain, red streaks, or lumpy areas in your breasts that may be accompanied by flu-like symptoms.  Painful urination, or inability to control urination.  Nausea, vomiting, dizziness, or fainting.  Feelings of extreme sadness or anxiety, or a feeling that you don’t want to be with your baby.  Redness, warmth, or pain in the lower leg.  Chest pain or shortness of breath.    Mom & Baby Hour: Meets in person every WEDNESDAY at 10am at the [facility] in [place] ([address]) in the community education room. The group is for new moms and their babies up to 6 months of age. It includes breastfeeding supports with a certified lactation educator to be available to answer your breastfeeding questions.      [facility] has great support for our families even after discharge.  We have virtual or in-person support groups.  Visit our website for the most up-to-date info for our many different support groups. [email or web address]       Outpatient Lactation appoints.  Call [phone] to schedule an appt.  Our office is located in the Maternal Fetal Medicine office next to [facility] on the first floor.      Moms Support Groups  Our weekly New Mom Support Groups are for any new parents in our community. They are led by an experienced Mother/Baby nurse or IBCLC and usually include a guest speaker on a topic of interest to new parents. These in-person groups also include Breastfeeding Support at each meeting. Bring your baby ( - 6  months) with you! Moms-to-be are also welcome! All mom's welcome even if its not your first.     MOM & BABY HOUR   Meets most  10:00 - 11:30 a.m.  Masks are not required, but be considerate of others and do not attend if mom or baby have had any symptoms of illness within the previous 24 hours. Breastfeeding support will be included at each session--just ask the leader any breastfeeding questions you may have. Location [facility] [place] [address] [place] Go inside the front door and to the right to the “Community Education Room”.    Mom's Line: [phone]   This service is provided by [facility], has a phone line dedicated for women (or anyone worried about a women) who may be experiencing signs or symptoms of postpartum depression.    Nurturing Mom- A support group for new and expectant moms looking for support with the transition to parenthood as well as those experiencing symptoms of  anxiety and/or depression.  Please contact [email or web address] if you need directions or the link for the virtual meetings. Please contact [email or web address] if you plan to attend, but please be considerate of others and do not attend if mom or baby have had any symptoms of illness within the previous 24 hours.     La Leche League for breast feeding and parent support, Website: IIIus.org  and for the [place] group and other groups visit https://www.facebook.com/pg/Val/events/.  to help find a group, all meetings are virtual.     Facebook groups-  for more support when home- [facility] --- you can find mom-to-mom advice and the list of speaker topics for cradle talk program.     Helpful websites:    www.llli.org  www.[employer]  www.Breastfeed[place].org  Www.ppdil.org/    - The Postpartum [employer] [place]: Volunteer organization that  provide informed support and  information to women and their families who are experiencing pregnancy and/or postpartum mood disorders either by phone or email.  Initiation of breast pumping after discharge:     - Add 1 pumping session a day, additional to the infant's feedings, 2-3 weeks after delivery.     - Pump both breasts for 15 mins, immediately after a breast feeding session.    - Pumping first thing in the morning will provide greater output.    - If you chose to pump more than once a day, you should be consistent every day to prevent a breast infection.      - Pump using an electric pump over a hands free pump (electric pumps provided stronger stimulation to the nipples).    - Store the breast milk in 2-3 oz containers.     - Label containers with date and time.    - Always feed oldest milk first.

## 2024-03-10 NOTE — PLAN OF CARE
Problem: Patient Centered Care  Goal: Patient preferences are identified and integrated in the patient's plan of care  Description: Interventions:  - What would you like us to know as we care for you? It's a girl!  - Provide timely, complete, and accurate information to patient/family  - Incorporate patient and family knowledge, values, beliefs, and cultural backgrounds into the planning and delivery of care  - Encourage patient/family to participate in care and decision-making at the level they choose  - Honor patient and family perspectives and choices  3/9/2024 2340 by Rekha Ye RN  Outcome: Progressing  3/9/2024 2339 by Rekha Ye RN  Outcome: Progressing     Problem: GENITOURINARY - ADULT  Goal: Absence of urinary retention  Description: INTERVENTIONS:  - Assess patient’s ability to void and empty bladder  - Monitor intake/output and perform bladder scan as needed  - Follow urinary retention protocol/standard of care  - Consider collaborating with pharmacy to review patient's medication profile  - Implement strategies to promote bladder emptying  3/9/2024 2340 by Rekha Ye RN  Outcome: Progressing  3/9/2024 2339 by Rekha Ye RN  Outcome: Progressing     Problem: POSTPARTUM  Goal: Optimize infant feeding at the breast  Description: INTERVENTIONS:  - Initiate breast feeding within first hour after birth.   - Monitor effectiveness of current breast feeding efforts.  - Assess support systems available to mother/family.  - Identify cultural beliefs/practices regarding lactation, letdown techniques, maternal food preferences.  - Assess mother's knowledge and previous experience with breast feeding.  - Provide information as needed about early infant feeding cues (e.g., rooting, lip smacking, sucking fingers/hand) versus late cue of crying.  - Discuss/demonstrate breast feeding aids (e.g., infant sling, nursing footstool/pillows, and breast pumps).  - Encourage mother/other family  members to express feelings/concerns, and actively listen.  - Educate father/SO about benefits of breast feeding and how to manage common lactation challenges.  - Recommend avoidance of specific medications or substances incompatible with breast feeding.  - Assess and monitor for signs of nipple pain/trauma.  - Instruct and provide assistance with proper latch.  - Review techniques for milk expression (breast pumping) and storage of breast milk. Provide pumping equipment/supplies, instructions and assistance, as needed.  - Encourage rooming-in and breast feeding on demand.  - Encourage skin-to-skin contact.  - Provide LC support as needed.  - Assess for and manage engorgement.  - Provide breast feeding education handouts and information on community breast feeding support.   3/9/2024 2340 by Rekha Ye RN  Outcome: Progressing  3/9/2024 2339 by Rekha Ye RN  Outcome: Progressing  Goal: Establishment of adequate milk supply with medication/procedure interruptions  Description: INTERVENTIONS:  - Review techniques for milk expression (breast pumping).   - Provide pumping equipment/supplies, instructions, and assistance until it is safe to breastfeed infant.  3/9/2024 2340 by Rekha Ye RN  Outcome: Progressing  3/9/2024 2339 by Rekha Ye RN  Outcome: Progressing  Goal: Appropriate maternal -  bonding  Description: INTERVENTIONS:  - Assess caregiver- interactions.  - Assess caregiver's emotional status and coping mechanisms.  - Encourage caregiver to participate in  daily care.  - Assess support systems available to mother/family.  - Provide /case management support as needed.  3/9/2024 2340 by Rekha Ye RN  Outcome: Progressing  3/9/2024 2339 by Rekha Ye RN  Outcome: Progressing

## 2024-03-10 NOTE — PLAN OF CARE
Problem: Patient Centered Care  Goal: Patient preferences are identified and integrated in the patient's plan of care  Description: Interventions:  - What would you like us to know as we care for you? It's a girl!  - Provide timely, complete, and accurate information to patient/family  - Incorporate patient and family knowledge, values, beliefs, and cultural backgrounds into the planning and delivery of care  - Encourage patient/family to participate in care and decision-making at the level they choose  - Honor patient and family perspectives and choices  Outcome: Completed     Problem: Patient/Family Goals  Goal: Patient/Family Long Term Goal  Description: Patient's Long Term Goal: Uncomplicated Delivery     Interventions:  - Assessment/Monitoring  - Induction/Augmentation per protocol and Provider order  - C/S per protocol and Provider order   - Education  - Intervention per protocol and Provider order with education   - Involve patient in POC  - See additional Care Plan goals for specific interventions  Outcome: Completed  Goal: Patient/Family Short Term Goal  Description: Patient's Short Term Goal: Comfort and Pain Control     Interventions:   - Non Pharmacological pain intervention   - IV/IM and Epidural pain medication per Provider order and patient request  - Education  - Involve Patient in POC   - See additional Care Plan goals for specific interventions  Outcome: Completed     Problem: GENITOURINARY - ADULT  Goal: Absence of urinary retention  Description: INTERVENTIONS:  - Assess patient’s ability to void and empty bladder  - Monitor intake/output and perform bladder scan as needed  - Follow urinary retention protocol/standard of care  - Consider collaborating with pharmacy to review patient's medication profile  - Implement strategies to promote bladder emptying  Outcome: Completed     Problem: POSTPARTUM  Goal: Long Term Goal:Experiences normal postpartum course  Description: INTERVENTIONS:  - Assess  and monitor vital signs and lab values.  - Assess fundus and lochia.  - Provide ice/sitz baths for perineum discomfort.  - Monitor healing of incision/episiotomy/laceration, and assess for signs and symptoms of infection and hematoma.  - Assess bladder function and monitor for bladder distention.  - Provide/instruct/assist with pericare as needed.  - Provide VTE prophylaxis as needed.  - Monitor bowel function.  - Encourage ambulation and provide assistance as needed.  - Assess and monitor emotional status and provide social service/psych resources as needed.  - Utilize standard precautions and use personal protective equipment as indicated. Ensure aseptic care of all intravenous lines and invasive tubes/drains.  - Obtain immunization and exposure to communicable diseases history.  Outcome: Completed  Goal: Optimize infant feeding at the breast  Description: INTERVENTIONS:  - Initiate breast feeding within first hour after birth.   - Monitor effectiveness of current breast feeding efforts.  - Assess support systems available to mother/family.  - Identify cultural beliefs/practices regarding lactation, letdown techniques, maternal food preferences.  - Assess mother's knowledge and previous experience with breast feeding.  - Provide information as needed about early infant feeding cues (e.g., rooting, lip smacking, sucking fingers/hand) versus late cue of crying.  - Discuss/demonstrate breast feeding aids (e.g., infant sling, nursing footstool/pillows, and breast pumps).  - Encourage mother/other family members to express feelings/concerns, and actively listen.  - Educate father/SO about benefits of breast feeding and how to manage common lactation challenges.  - Recommend avoidance of specific medications or substances incompatible with breast feeding.  - Assess and monitor for signs of nipple pain/trauma.  - Instruct and provide assistance with proper latch.  - Review techniques for milk expression (breast pumping)  and storage of breast milk. Provide pumping equipment/supplies, instructions and assistance, as needed.  - Encourage rooming-in and breast feeding on demand.  - Encourage skin-to-skin contact.  - Provide LC support as needed.  - Assess for and manage engorgement.  - Provide breast feeding education handouts and information on community breast feeding support.   Outcome: Completed  Goal: Establishment of adequate milk supply with medication/procedure interruptions  Description: INTERVENTIONS:  - Review techniques for milk expression (breast pumping).   - Provide pumping equipment/supplies, instructions, and assistance until it is safe to breastfeed infant.  Outcome: Completed  Goal: Experiences normal breast weaning course  Description: INTERVENTIONS:  - Assess for and manage engorgement.  - Instruct on breast care.  - Provide comfort measures.  Outcome: Completed  Goal: Appropriate maternal -  bonding  Description: INTERVENTIONS:  - Assess caregiver- interactions.  - Assess caregiver's emotional status and coping mechanisms.  - Encourage caregiver to participate in  daily care.  - Assess support systems available to mother/family.  - Provide /case management support as needed.  Outcome: Completed

## 2024-03-10 NOTE — PROGRESS NOTES
FOCUS: MOM DISCHARGE     D: DISCHARGE ORDERS RECEIVED FROM PROVIDER.     A: POSTPARTUM DISCHARGE AVS'S GIVEN AND REVIEWED EXTENSIVELY, PRESCRIPTIONS REVIEWED. VOCALIZED UNDERSTANDING. PATIENT INFORMED WHEN TO MAKE FOLLOW UP APPOINTMENTS WITH PROVIDER.     R: PARENT(S) INTERACTING APPROPRIATELY WITH INFANT IN SCN. VERBALIZED UNDERSTANDING OF FOLLOW UP INSTRUCTIONS. MOTHER BOARDING IN ROOM 370 WITH INFANT.

## 2024-03-11 ENCOUNTER — TELEPHONE (OUTPATIENT)
Dept: OBGYN CLINIC | Facility: CLINIC | Age: 39
End: 2024-03-11

## 2024-03-11 NOTE — TELEPHONE ENCOUNTER
Pt due for her 6-week post partum appt on 4/19, but asking if she can get an early appt the following week ([place] vac[place]), ok to seem someone else?    Pls advise

## 2024-03-11 NOTE — PAYOR COMM NOTE
--------------  DISCHARGE REVIEW    Payor: [facility] OUT OF STATE PPO  Subscriber #:  [id number]  Authorization Number: [id number]    Admit date: 3/8/24  Admit time:   2:47 AM  Discharge Date: 3/10/2024  3:43 PM     Admitting Physician: Emile Khan DO  Attending Physician:  No att. providers found  Primary Care Physician: No primary care provider on file.          Discharge Summary Notes        Discharge Summary signed by Rekha Guerrero MD at 3/10/2024 12:38 PM       Author: Rekha Guerrero MD Specialty: OBSTETRICS & GYNECOLOGY Author Type: Physician    Filed: 3/10/2024 12:38 PM Date of Service: 3/10/2024 12:38 PM Status: Signed    : Rekha Guerrero MD (Physician)           [facility]  part of [facility]    Discharge Summary    Yesenia Currie Patient Status:  Inpatient    1985 MRN [id number]   Location [facility] Attending Emile Khan DO   Hosp Day # 2       Admission date:  3/8/2024    Delivering OB Clinician: Dilcia    EDC: Estimated Date of Delivery: 4/10/24    Gestational Age: 35w2d    Antepartum complications:   Patient Active Problem List   Diagnosis    AMA--38    BMI 33.0-33.9,adult    Pre-existing diabetes mellitus during pregnancy (HCC)    Pregnancy (HCC)     premature rupture of membranes (PPROM) with onset of labor within 24 hours of rupture in third trimester, antepartum (HCC)       Date of Delivery: 3/8/2024  Time of Delivery: 2:44 PM     Delivery Type: spontaneous vaginal delivery    Baby: Liveborn female   Information for the patient's :  Kush Girl [[id number]]   5 lb 15.9 oz (2.72 kg)   Apgars:  1 minute: 9   5 minutes: 9 10 minutes:       Intrapartum Complications: None    Discharge Date: 3/10/24    Hospital Course: Pt admitted PPROM at 35 weeks on 3/8 at 2:45a  and +UC's.  She progressed to an precipitous  attended by RN of a viable female infant over 2nd degree laceration and repair. No complications. Routine  delivery and postpartum care.    Discharge Physical Exam:   /70 (BP Location: Right arm)   Pulse 78   Temp 98 °F (36.7 °C) (Oral)   Resp 15   Ht 5' 2\" (1.575 m)   Wt 193 lb (87.5 kg)   LMP 07/05/2023 (Exact Date)   SpO2 98%   Breastfeeding Yes   BMI 35.30 kg/m²   General appearance:  alert, appears stated age, cooperative and no distress  Abdominal: soft, non-tender, no rebound  Uterus: firm, nontender, below umbilicus  Pelvic: deferred  Extremities: Homans sign is negative, no sign of DVT    Discharged Condition: stable    Disposition: home    Plan:     Follow-up appointment in 6 weeks with Dr. Ha        3/8/2024  4:44 PM         Electronically signed by Rekha Guerrero MD on 3/10/2024 12:38 PM         REVIEWER COMMENTS

## 2024-03-11 NOTE — TELEPHONE ENCOUNTER
3/8. Pt calling today regarding showering post delivery. Asking about taking care of stitches. Pt informed can shower as normal, do not recommend soap or aggressive washing of the vaginal area. Pt informed can use michelle bottle in the shower to spray area with warm water. Encouraged use also post voiding and bowel movements. Pt states understanding.

## 2024-03-11 NOTE — TELEPHONE ENCOUNTER
PSR pt can be seen between 6-8 wks for post partum appt. She can not be seen the wk before and not after 8 wks otherwise insurance will not cover. Please assist with scheduling accordingly. Thank you.

## 2024-03-26 ENCOUNTER — TELEPHONE (OUTPATIENT)
Dept: OBGYN UNIT | Facility: HOSPITAL | Age: 39
End: 2024-03-26

## 2024-04-29 ENCOUNTER — POSTPARTUM (OUTPATIENT)
Dept: OBGYN CLINIC | Facility: CLINIC | Age: 39
End: 2024-04-29
Payer: COMMERCIAL

## 2024-04-29 VITALS
WEIGHT: 175.38 LBS | HEART RATE: 76 BPM | SYSTOLIC BLOOD PRESSURE: 118 MMHG | DIASTOLIC BLOOD PRESSURE: 72 MMHG | BODY MASS INDEX: 32 KG/M2

## 2024-04-29 PROCEDURE — 3078F DIAST BP <80 MM HG: CPT | Performed by: OBSTETRICS & GYNECOLOGY

## 2024-04-29 PROCEDURE — 96127 BRIEF EMOTIONAL/BEHAV ASSMT: CPT | Performed by: OBSTETRICS & GYNECOLOGY

## 2024-04-29 PROCEDURE — 3074F SYST BP LT 130 MM HG: CPT | Performed by: OBSTETRICS & GYNECOLOGY

## 2024-04-29 NOTE — PROGRESS NOTES
HPI    Yesenia Currie is a 38 year old female  here for 6 week post-partum visit.  Patient delivered a  female infant on 3/8/2024.  Patient desires condoms/vasectomy for contraception.  Patient is breast & bottle feeding-85/15%.   Patient denies symptoms of depression, [place]  depression scale score of 7 out of 30.    [place]  DEPRESSION SCALE  I have been able to laugh and see the funny side of things: As much as I always could  I have looked forward with enjoyment to things: As much as I ever did  I have been anxious or worried for no good reason: Hardly ever  I have felt scared or panicky for no good reason: No, not at all  Things have been getting on top of me: Yes, sometimes I haven't been coping as well as usual  I have been so unhappy that I have had difficulty sleeping: Not at all  I have felt sad or miserable: Not very often  I have been so unhappy that I have been crying: Only occasionally  The thought of harming myself has occurred to me: Never  Total: 7     OBSTETRIC HISTORY  OB History    Para Term  AB Living   3 1   1 2 1   SAB IAB Ectopic Multiple Live Births         0 1      # Outcome Date GA Lbr Ahyes/2nd Weight Sex Type Anes PTL Lv   3  24 35w2d 08:12 / 00:02 5 lb 15.9 oz (2.72 kg) F NORMAL SPONT EPI Y JENNIFER      Complications: Variable decelerations, Late decelerations   2 AB      THERAPEUTIC      1 AB                MEDICATIONS:    Current Outpatient Medications:     Probiotic Product (PROBIOTIC-10) Oral Chew Tab, Chew by mouth., Disp: , Rfl:     prenatal vitamin with DHA 27-0.8-228 MG Oral Cap, Take 1 capsule by mouth daily., Disp: , Rfl:     METAMUCIL FIBER OR, Take by mouth. (Patient not taking: Reported on 2024), Disp: , Rfl:     aspirin 81 MG Oral Tab EC, Take 1 tablet (81 mg total) by mouth daily. (Patient not taking: Reported on 2024), Disp: , Rfl:     ALLERGIES:    Allergies   Allergen Reactions    Shellfish Allergy  ITCHING, RASH and SWELLING     Rash and swelling    Shellfish-Derived Products HIVES, ITCHING and SWELLING       PHYSICAL EXAM  Blood pressure 118/72, pulse 76, weight 175 lb 6.4 oz (79.6 kg), last menstrual period 07/05/2023, currently breastfeeding.  General:  Well nourished, well developed woman in no acute distress  Abdomen:  soft, nontender, no masses  External Genitalia: normal appearance, hair distribution, and no lesions  Vagina:  Normal appearance without lesions, no abnormal discharge  Cervix:  Normal without tenderness on motion  Uterus: normal in size, contour, position, mobility, without tenderness  Adnexa: normal without masses or tenderness  Perineum: well healed perineum  Anus: no hemorroids       ASSESSMENT/PLAN      ICD-10-CM    1. Postpartum care and examination (HCC)  Z39.2         Discussed all options of birthcontrol including ocps, minipill, Mirena or Paragard IUD, nuvaring, orthoevra patch, nexplanon, Depoprovera, condoms or tubal sterilization options. Patient has chosen condom and vasectomy.  Patient to return for annual gyne exam in August.    No orders of the defined types were placed in this encounter.

## (undated) NOTE — LETTER
[facility]  Administration of Anesthesia  Yesenia SINGH agree to be cared for by a physician anesthesiologist alone and/or with a nurse anesthetist, who is specially trained to monitor me and give me medicine to put me to sleep or keep me comfortable during my procedure    I understand that my anesthesiologist and/or anesthetist is not an employee or agent of [facility] or [employer]. He or she works for [facility]    As the patient asking for anesthesia services, I agree to:  Allow the anesthesiologist (anesthesia doctor) to give me medicine and do additional procedures as necessary. Some examples are: Starting or using an “IV” to give me medicine, fluids or blood during my procedure, and having a breathing tube placed to help me breathe when I’m asleep (intubation). In the event that my heart stops working properly, I understand that my anesthesiologist will make every effort to sustain my life, unless otherwise directed by [facility] Do Not Resuscitate documents.  Tell my anesthesia doctor before my procedure:  If I am pregnant.  The last time that I ate or drank.  iii. All of the medicines I take (including prescriptions, herbal supplements, and pills I can buy without a prescription (including street drugs/illegal medications). Failure to inform my anesthesiologist about these medicines may increase my risk of anesthetic complications.  iv.If I am allergic to anything or have had a reaction to anesthesia before.  I understand how the anesthesia medicine will help me (benefits).  I understand that with any type of anesthesia medicine there are risks:  The most common risks are: nausea, vomiting, sore throat, muscle soreness, damage to my eyes, mouth, or teeth (from breathing tube placement).  Rare risks include: remembering what happened during my procedure, allergic reactions to medications, injury to my airway, heart, lungs, vision, nerves, or  muscles and in extremely rare instances death.  My doctor has explained to me other choices available to me for my care (alternatives).  Pregnant Patients (“epidural”):  I understand that the risks of having an epidural (medicine given into my back to help control pain during labor), include itching, low blood pressure, difficulty urinating, headache or slowing of the baby’s heart. Very rare risks include infection, bleeding, seizure, irregular heart rhythms and nerve injury.  Regional Anesthesia (“spinal”, “epidural”, & “nerve blocks”):  I understand that rare but potential complications include headache, bleeding, infection, seizure, irregular heart rhythms, and nerve injury.    _____________________________________________________________________________  Patient (or Representative) Signature/Relationship to Patient  Date   Time    _____________________________________________________________________________   Name (if used)    Language/Organization   Time    _____________________________________________________________________________  Nurse Anesthetist Signature     Date   Time  _____________________________________________________________________________  Anesthesiologist Signature     Date   Time  I have discussed the procedure and information above with the patient (or patient’s representative) and answered their questions. The patient or their representative has agreed to have anesthesia services.    _____________________________________________________________________________  Witness        Date   Time  I have verified that the signature is that of the patient or patient’s representative, and that it was signed before the procedure  Patient Name: Yesenia Currie     : 1985                 Printed: 3/8/2024 at 2:48 AM    Medical Record #: [id number]                                            Page 1 of 1  ----------ANESTHESIA CONSENT----------